# Patient Record
Sex: MALE | Race: WHITE | NOT HISPANIC OR LATINO | Employment: FULL TIME | RURAL
[De-identification: names, ages, dates, MRNs, and addresses within clinical notes are randomized per-mention and may not be internally consistent; named-entity substitution may affect disease eponyms.]

---

## 2021-01-04 ENCOUNTER — LAB (OUTPATIENT)
Dept: LAB | Facility: HOSPITAL | Age: 26
End: 2021-01-04

## 2021-01-04 ENCOUNTER — OFFICE VISIT (OUTPATIENT)
Dept: FAMILY MEDICINE CLINIC | Facility: CLINIC | Age: 26
End: 2021-01-04

## 2021-01-04 VITALS
WEIGHT: 181.5 LBS | BODY MASS INDEX: 23.29 KG/M2 | RESPIRATION RATE: 20 BRPM | DIASTOLIC BLOOD PRESSURE: 80 MMHG | OXYGEN SATURATION: 99 % | HEART RATE: 60 BPM | TEMPERATURE: 97.7 F | HEIGHT: 74 IN | SYSTOLIC BLOOD PRESSURE: 120 MMHG

## 2021-01-04 DIAGNOSIS — R30.0 DYSURIA: ICD-10-CM

## 2021-01-04 DIAGNOSIS — R10.9 ABDOMINAL CRAMPING: Primary | ICD-10-CM

## 2021-01-04 DIAGNOSIS — R11.0 NAUSEA: ICD-10-CM

## 2021-01-04 DIAGNOSIS — Z20.2 POSSIBLE EXPOSURE TO STD: ICD-10-CM

## 2021-01-04 LAB
BILIRUB BLD-MCNC: NEGATIVE MG/DL
CLARITY, POC: ABNORMAL
COLOR UR: ABNORMAL
GLUCOSE UR STRIP-MCNC: NEGATIVE MG/DL
KETONES UR QL: NEGATIVE
LEUKOCYTE EST, POC: NEGATIVE
NITRITE UR-MCNC: NEGATIVE MG/ML
PH UR: 8 [PH] (ref 5–8)
PROT UR STRIP-MCNC: ABNORMAL MG/DL
RBC # UR STRIP: NEGATIVE /UL
SP GR UR: 1.01 (ref 1–1.03)
UROBILINOGEN UR QL: NORMAL

## 2021-01-04 PROCEDURE — 87661 TRICHOMONAS VAGINALIS AMPLIF: CPT | Performed by: NURSE PRACTITIONER

## 2021-01-04 PROCEDURE — 85027 COMPLETE CBC AUTOMATED: CPT | Performed by: NURSE PRACTITIONER

## 2021-01-04 PROCEDURE — 87491 CHLMYD TRACH DNA AMP PROBE: CPT | Performed by: NURSE PRACTITIONER

## 2021-01-04 PROCEDURE — 96372 THER/PROPH/DIAG INJ SC/IM: CPT | Performed by: NURSE PRACTITIONER

## 2021-01-04 PROCEDURE — 82150 ASSAY OF AMYLASE: CPT | Performed by: NURSE PRACTITIONER

## 2021-01-04 PROCEDURE — 99203 OFFICE O/P NEW LOW 30 MIN: CPT | Performed by: NURSE PRACTITIONER

## 2021-01-04 PROCEDURE — 80053 COMPREHEN METABOLIC PANEL: CPT | Performed by: NURSE PRACTITIONER

## 2021-01-04 PROCEDURE — 83690 ASSAY OF LIPASE: CPT | Performed by: NURSE PRACTITIONER

## 2021-01-04 PROCEDURE — 87591 N.GONORRHOEAE DNA AMP PROB: CPT | Performed by: NURSE PRACTITIONER

## 2021-01-04 RX ORDER — DICYCLOMINE HCL 20 MG
20 TABLET ORAL EVERY 6 HOURS PRN
Qty: 20 TABLET | Refills: 0 | Status: SHIPPED | OUTPATIENT
Start: 2021-01-04 | End: 2021-01-22

## 2021-01-04 RX ORDER — ONDANSETRON 8 MG/1
8 TABLET, ORALLY DISINTEGRATING ORAL EVERY 8 HOURS PRN
Qty: 20 TABLET | Refills: 0 | Status: SHIPPED | OUTPATIENT
Start: 2021-01-04 | End: 2021-01-22

## 2021-01-04 RX ORDER — DOXYCYCLINE HYCLATE 100 MG/1
100 CAPSULE ORAL 2 TIMES DAILY
Qty: 14 CAPSULE | Refills: 0 | Status: SHIPPED | OUTPATIENT
Start: 2021-01-04 | End: 2021-01-11

## 2021-01-04 RX ORDER — CEFTRIAXONE 500 MG/1
500 INJECTION, POWDER, FOR SOLUTION INTRAMUSCULAR; INTRAVENOUS ONCE
Status: COMPLETED | OUTPATIENT
Start: 2021-01-04 | End: 2021-01-04

## 2021-01-04 RX ADMIN — CEFTRIAXONE 500 MG: 500 INJECTION, POWDER, FOR SOLUTION INTRAMUSCULAR; INTRAVENOUS at 11:54

## 2021-01-04 NOTE — PROGRESS NOTES
"Chief Complaint  No chief complaint on file.    Subjective    History of Present Illness      Con Kirkpatrick presents to [facility] for   [facility] Same Day/Walk in Clinic    PCP: None    CC: \"my stomach has been hurting last few days and I was feeling dizzy and light headed\"    Recently released from penitentiary.  Has been out for two months.  Is working and got a little dizzy today and had to leave.  Had unprotected sex with new partner recently and has since has some abdominal cramping, occasional discharge and some dysuria.  Hx of GC&C several years ago which was treated.       Abdominal Pain  This is a new problem. The current episode started in the past 7 days. The onset quality is gradual. The problem occurs intermittently. The problem has been waxing and waning. The pain is located in the generalized abdominal region. The pain is at a severity of 6/10. The quality of the pain is cramping. The abdominal pain does not radiate. Associated symptoms include nausea. Pertinent negatives include no anorexia, arthralgias, belching, constipation, diarrhea (\"looser than normal\"), dysuria, fever, flatus, frequency, headaches, hematochezia, hematuria, melena, myalgias, vomiting or weight loss. Nothing aggravates the pain. The pain is relieved by nothing. He has tried nothing for the symptoms. There is no history of abdominal surgery, colon cancer, Crohn's disease, gallstones, GERD, irritable bowel syndrome, pancreatitis, PUD or ulcerative colitis.   Exposure to STD  The patient's primary symptoms include penile discharge. The patient's pertinent negatives include no genital injury, genital itching, genital lesions, pelvic pain, penile pain, priapism, scrotal swelling or testicular pain. This is a new problem. The current episode started in the past 7 days. The problem occurs intermittently. The problem has been waxing and waning. Associated symptoms include abdominal pain and " "nausea. Pertinent negatives include no anorexia, chest pain, chills, constipation, coughing, diarrhea (\"looser than normal\"), discolored urine, dysuria, fever, flank pain, frequency, headaches, hematuria, hesitancy, joint pain, joint swelling, painful intercourse, rash, shortness of breath, sore throat, urgency, urinary retention or vomiting. He is sexually active. He inconsistently uses condoms. It is possible that his partner has an STD. His past medical history is significant for chlamydia and gonorrhea.        Objective   Vital Signs:   /80 (BP Location: Right arm, Patient Position: Sitting, Cuff Size: Adult)   Pulse 60   Temp 97.7 °F (36.5 °C) (Temporal)   Resp 20   Ht 188 cm (74\")   Wt 82.3 kg (181 lb 8 oz)   SpO2 99%   BMI 23.30 kg/m²     Physical Exam  Vitals signs and nursing note reviewed.   Constitutional:       General: He is not in acute distress.     Appearance: Normal appearance. He is not ill-appearing.   HENT:      Head: Normocephalic and atraumatic.      Right Ear: Tympanic membrane and ear canal normal.      Left Ear: Tympanic membrane and ear canal normal.      Nose: Nose normal.      Mouth/Throat:      Mouth: Mucous membranes are moist.      Pharynx: Oropharynx is clear. No oropharyngeal exudate or posterior oropharyngeal erythema.   Eyes:      General:         Right eye: No discharge.         Left eye: No discharge.      Conjunctiva/sclera: Conjunctivae normal.   Neck:      Musculoskeletal: Neck supple.   Cardiovascular:      Rate and Rhythm: Normal rate and regular rhythm.   Pulmonary:      Effort: Pulmonary effort is normal. No respiratory distress.      Breath sounds: Normal breath sounds. No wheezing, rhonchi or rales.   Abdominal:      General: Bowel sounds are normal.      Palpations: Abdomen is soft.      Tenderness: There is no abdominal tenderness. There is no right CVA tenderness, left CVA tenderness, guarding or rebound.      Comments: Mild generalized TTP, no rebound or " guarding noted   Lymphadenopathy:      Cervical: No cervical adenopathy.   Skin:     General: Skin is warm and dry.      Comments: +tattoos     Neurological:      General: No focal deficit present.      Mental Status: He is alert and oriented to person, place, and time.        Result Review :     UA    Urinalysis 1/4/21   Ketones, UA Negative   Leukocytes, UA Negative                     Assessment and Plan    Problem List Items Addressed This Visit     None      Visit Diagnoses     Abdominal cramping    -  Primary    Relevant Medications    dicyclomine (Bentyl) 20 MG tablet    Other Relevant Orders    CBC No Differential    Comprehensive metabolic panel    Amylase    Lipase    XR Abdomen Flat & Upright (In Office)    Dysuria        Relevant Orders    POCT urinalysis dipstick, automated (Completed)    Chlamydia trachomatis, Neisseria gonorrhoeae, Trichomonas vaginalis, PCR - Urine, Urine, Clean Catch    Nausea        Relevant Medications    ondansetron ODT (Zofran ODT) 8 MG disintegrating tablet    Other Relevant Orders    CBC No Differential    Comprehensive metabolic panel    Amylase    Lipase    XR Abdomen Flat & Upright (In Office)    Possible exposure to STD        Relevant Medications    doxycycline (VIBRAMYCIN) 100 MG capsule    cefTRIAXone (ROCEPHIN) injection 500 mg (Completed) (Start on 1/4/2021 12:30 PM)        Increase fluids  [place], BRAT diet for now  Rx for Bentyl, Zofran as needed for cramping/nausea  CBC, CMP, Amylase, Lipase pending  Abdominal xrays pending    GC&C pending  Treated with Rocephin, Doxycycline due to suspected STD exposure.   Safe sex practices encouraged.     Encouraged to establish with PCP prior to leaving today    RTW: 1-6-2021    See PCP or RTC if symptoms persist/worsen  See PCP for routine f/u visit and management of chronic medical conditions      This document has been electronically signed by PALOMO Spears on January 4, 2021 12:11 CST,.

## 2021-01-05 LAB
ALBUMIN SERPL-MCNC: 4.3 G/DL (ref 3.5–5.2)
ALBUMIN/GLOB SERPL: 1.9 G/DL
ALP SERPL-CCNC: 60 U/L (ref 39–117)
ALT SERPL W P-5'-P-CCNC: 10 U/L (ref 1–41)
AMYLASE SERPL-CCNC: 110 U/L (ref 28–100)
ANION GAP SERPL CALCULATED.3IONS-SCNC: 7.7 MMOL/L (ref 5–15)
AST SERPL-CCNC: 12 U/L (ref 1–40)
BILIRUB SERPL-MCNC: 0.3 MG/DL (ref 0–1.2)
BUN SERPL-MCNC: 12 MG/DL (ref 6–20)
BUN/CREAT SERPL: 10.9 (ref 7–25)
C TRACH RRNA CVX QL NAA+PROBE: NEGATIVE
CALCIUM SPEC-SCNC: 9.2 MG/DL (ref 8.6–10.5)
CHLORIDE SERPL-SCNC: 105 MMOL/L (ref 98–107)
CO2 SERPL-SCNC: 27.3 MMOL/L (ref 22–29)
CREAT SERPL-MCNC: 1.1 MG/DL (ref 0.76–1.27)
DEPRECATED RDW RBC AUTO: 38.3 FL (ref 37–54)
ERYTHROCYTE [DISTWIDTH] IN BLOOD BY AUTOMATED COUNT: 12.1 % (ref 12.3–15.4)
GFR SERPL CREATININE-BSD FRML MDRD: 80 ML/MIN/1.73
GLOBULIN UR ELPH-MCNC: 2.3 GM/DL
GLUCOSE SERPL-MCNC: 83 MG/DL (ref 65–99)
HCT VFR BLD AUTO: 44.7 % (ref 37.5–51)
HGB BLD-MCNC: 15.6 G/DL (ref 13–17.7)
LIPASE SERPL-CCNC: 131 U/L (ref 13–60)
MCH RBC QN AUTO: 30.4 PG (ref 26.6–33)
MCHC RBC AUTO-ENTMCNC: 34.9 G/DL (ref 31.5–35.7)
MCV RBC AUTO: 87.1 FL (ref 79–97)
N GONORRHOEA RRNA SPEC QL NAA+PROBE: NEGATIVE
PLATELET # BLD AUTO: 232 10*3/MM3 (ref 140–450)
PMV BLD AUTO: 11.6 FL (ref 6–12)
POTASSIUM SERPL-SCNC: 4.6 MMOL/L (ref 3.5–5.2)
PROT SERPL-MCNC: 6.6 G/DL (ref 6–8.5)
RBC # BLD AUTO: 5.13 10*6/MM3 (ref 4.14–5.8)
SODIUM SERPL-SCNC: 140 MMOL/L (ref 136–145)
WBC # BLD AUTO: 6.85 10*3/MM3 (ref 3.4–10.8)

## 2021-01-22 ENCOUNTER — OFFICE VISIT (OUTPATIENT)
Dept: FAMILY MEDICINE CLINIC | Facility: CLINIC | Age: 26
End: 2021-01-22

## 2021-01-22 ENCOUNTER — LAB (OUTPATIENT)
Dept: LAB | Facility: HOSPITAL | Age: 26
End: 2021-01-22

## 2021-01-22 VITALS
HEART RATE: 82 BPM | OXYGEN SATURATION: 97 % | HEIGHT: 74 IN | BODY MASS INDEX: 23.87 KG/M2 | WEIGHT: 186 LBS | TEMPERATURE: 98 F | SYSTOLIC BLOOD PRESSURE: 134 MMHG | DIASTOLIC BLOOD PRESSURE: 94 MMHG

## 2021-01-22 DIAGNOSIS — M21.42 PES PLANUS OF BOTH FEET: ICD-10-CM

## 2021-01-22 DIAGNOSIS — Z78.9 HISTORY OF INCARCERATION: ICD-10-CM

## 2021-01-22 DIAGNOSIS — R10.84 INTERMITTENT GENERALIZED ABDOMINAL PAIN: ICD-10-CM

## 2021-01-22 DIAGNOSIS — S46.812A TRAPEZIUS MUSCLE STRAIN, LEFT, INITIAL ENCOUNTER: Primary | ICD-10-CM

## 2021-01-22 DIAGNOSIS — Z11.59 NEED FOR HEPATITIS C SCREENING TEST: ICD-10-CM

## 2021-01-22 DIAGNOSIS — M21.41 PES PLANUS OF BOTH FEET: ICD-10-CM

## 2021-01-22 DIAGNOSIS — R82.90 ABNORMAL URINE: ICD-10-CM

## 2021-01-22 LAB
AMYLASE SERPL-CCNC: 56 U/L (ref 28–100)
BACTERIA UR QL AUTO: ABNORMAL /HPF
BILIRUB UR QL STRIP: NEGATIVE
CLARITY UR: ABNORMAL
COLOR UR: YELLOW
CRP SERPL-MCNC: <0.3 MG/DL (ref 0–0.5)
ERYTHROCYTE [SEDIMENTATION RATE] IN BLOOD: 4 MM/HR (ref 0–15)
GLUCOSE UR STRIP-MCNC: NEGATIVE MG/DL
HCV AB SER DONR QL: NORMAL
HGB UR QL STRIP.AUTO: NEGATIVE
HYALINE CASTS UR QL AUTO: ABNORMAL /LPF
KETONES UR QL STRIP: NEGATIVE
LEUKOCYTE ESTERASE UR QL STRIP.AUTO: NEGATIVE
LIPASE SERPL-CCNC: 29 U/L (ref 13–60)
NITRITE UR QL STRIP: NEGATIVE
PH UR STRIP.AUTO: 7 [PH] (ref 5–9)
PROT UR QL STRIP: NEGATIVE
RBC # UR: ABNORMAL /HPF
REF LAB TEST METHOD: ABNORMAL
SP GR UR STRIP: 1.01 (ref 1–1.03)
SQUAMOUS #/AREA URNS HPF: ABNORMAL /HPF
UROBILINOGEN UR QL STRIP: ABNORMAL
WBC UR QL AUTO: ABNORMAL /HPF

## 2021-01-22 PROCEDURE — 81001 URINALYSIS AUTO W/SCOPE: CPT | Performed by: STUDENT IN AN ORGANIZED HEALTH CARE EDUCATION/TRAINING PROGRAM

## 2021-01-22 PROCEDURE — 82150 ASSAY OF AMYLASE: CPT | Performed by: STUDENT IN AN ORGANIZED HEALTH CARE EDUCATION/TRAINING PROGRAM

## 2021-01-22 PROCEDURE — 86038 ANTINUCLEAR ANTIBODIES: CPT | Performed by: STUDENT IN AN ORGANIZED HEALTH CARE EDUCATION/TRAINING PROGRAM

## 2021-01-22 PROCEDURE — 86480 TB TEST CELL IMMUN MEASURE: CPT | Performed by: STUDENT IN AN ORGANIZED HEALTH CARE EDUCATION/TRAINING PROGRAM

## 2021-01-22 PROCEDURE — 86140 C-REACTIVE PROTEIN: CPT | Performed by: STUDENT IN AN ORGANIZED HEALTH CARE EDUCATION/TRAINING PROGRAM

## 2021-01-22 PROCEDURE — 83690 ASSAY OF LIPASE: CPT | Performed by: STUDENT IN AN ORGANIZED HEALTH CARE EDUCATION/TRAINING PROGRAM

## 2021-01-22 PROCEDURE — 86803 HEPATITIS C AB TEST: CPT | Performed by: STUDENT IN AN ORGANIZED HEALTH CARE EDUCATION/TRAINING PROGRAM

## 2021-01-22 PROCEDURE — 20552 NJX 1/MLT TRIGGER POINT 1/2: CPT | Performed by: STUDENT IN AN ORGANIZED HEALTH CARE EDUCATION/TRAINING PROGRAM

## 2021-01-22 PROCEDURE — 99214 OFFICE O/P EST MOD 30 MIN: CPT | Performed by: STUDENT IN AN ORGANIZED HEALTH CARE EDUCATION/TRAINING PROGRAM

## 2021-01-22 PROCEDURE — G0480 DRUG TEST DEF 1-7 CLASSES: HCPCS | Performed by: STUDENT IN AN ORGANIZED HEALTH CARE EDUCATION/TRAINING PROGRAM

## 2021-01-22 PROCEDURE — 85651 RBC SED RATE NONAUTOMATED: CPT | Performed by: STUDENT IN AN ORGANIZED HEALTH CARE EDUCATION/TRAINING PROGRAM

## 2021-01-22 RX ORDER — MELOXICAM 15 MG/1
15 TABLET ORAL DAILY
Qty: 30 TABLET | Refills: 0 | Status: SHIPPED | OUTPATIENT
Start: 2021-01-22 | End: 2021-05-14 | Stop reason: SDUPTHER

## 2021-01-22 NOTE — PROGRESS NOTES
"   Subjective:  Con Kirkpatrick is a 25 y.o. male who presents for left shoulder pain    Went to urgent care last thursday; had a systemic steroid injection which seemed to have helped but only minimally.  But since has had severe, debilitating, shoulder pain.  Denied trauma, inciting incident.  No swelling, erythema, or redness, fever, chills.  Denies any weakness, denies any numbness or tingling.  Aggravated by movement, no alleviating factors.    Additionally, patient with recent history of incarceration, does not remember if he is been screened for tuberculosis but believes he has been.  Denies any cough, bloody sputum, shortness of breath, chest pain.  Had several present tattoos, denied sharing needle, denied being tested for hep C.    Intermittent abdominal pain; patient states it comes and goes, does not have diarrhea or constipation, no food intolerance, acid reflux, epigastric pain.  No abdominal tenderness currently, no distention.     Lastly, patient states he has flat feet, would like referral for custom orthotics as it has helped in the past, no trauma, lesions or pain currently.    Vitals:    Vitals:    01/22/21 1301   BP: 134/94   Pulse: 82   Temp: 98 °F (36.7 °C)   SpO2: 97%   Weight: 84.4 kg (186 lb)   Height: 188 cm (74\")       Body mass index is 23.88 kg/m².      Current Outpatient Medications:   •  meloxicam (MOBIC) 15 MG tablet, Take 1 tablet by mouth Daily., Disp: 30 tablet, Rfl: 0    Review of Systems  Review of Systems   Constitutional: Negative for chills and fever.   HENT: Negative for congestion and sore throat.    Eyes: Negative for pain and visual disturbance.   Respiratory: Negative for cough and shortness of breath.    Cardiovascular: Negative for chest pain and palpitations.   Gastrointestinal: Negative for nausea and vomiting.   Endocrine: Negative for polydipsia and polyuria.   Genitourinary: Negative for dysuria and hematuria.   Musculoskeletal: Positive for neck pain.   "   Skin: Negative for rash and wound.   Neurological: Negative for dizziness and headaches.   Psychiatric/Behavioral: Negative for behavioral problems and confusion.       There is no problem list on file for this patient.    Past Surgical History:   Procedure Laterality Date   • HERNIA REPAIR       Social History     Socioeconomic History   • Marital status: Single     Spouse name: Not on file   • Number of children: Not on file   • Years of education: Not on file   • Highest education level: Not on file   Tobacco Use   • Smoking status: Never Smoker   • Smokeless tobacco: Never Used   Substance and Sexual Activity   • Alcohol use: Defer   • Drug use: Defer   • Sexual activity: Defer     Family History   Problem Relation Age of Onset   • Hypertension Mother      Office Visit on 01/04/2021   Component Date Value Ref Range Status   • Color 01/04/2021 Dark Yellow  Yellow, Straw, Dark Yellow, Charlene Final   • Clarity, UA 01/04/2021 Cloudy* Clear Final   • Specific Gravity  01/04/2021 1.015  1.005 - 1.030 Final   • pH, Urine 01/04/2021 8.0  5.0 - 8.0 Final   • Leukocytes 01/04/2021 Negative  Negative Final   • Nitrite, UA 01/04/2021 Negative  Negative Final   • Protein, POC 01/04/2021 Trace* Negative mg/dL Final   • Glucose, UA 01/04/2021 Negative  Negative, 1000 mg/dL (3+) mg/dL Final   • Ketones, UA 01/04/2021 Negative  Negative Final   • Urobilinogen, UA 01/04/2021 Normal  Normal Final   • Bilirubin 01/04/2021 Negative  Negative Final   • Blood, UA 01/04/2021 Negative  Negative Final   • Chlamydia DNA by PCR 01/04/2021 Negative  Negative Final   • Neisseria gonorrhoeae by PCR 01/04/2021 Negative  Negative Final   • WBC 01/04/2021 6.85  3.40 - 10.80 10*3/mm3 Final   • RBC 01/04/2021 5.13  4.14 - 5.80 10*6/mm3 Final   • Hemoglobin 01/04/2021 15.6  13.0 - 17.7 g/dL Final   • Hematocrit 01/04/2021 44.7  37.5 - 51.0 % Final   • MCV 01/04/2021 87.1  79.0 - 97.0 fL Final   • MCH 01/04/2021 30.4  26.6 - 33.0 pg Final   • MCHC  01/04/2021 34.9  31.5 - 35.7 g/dL Final   • RDW 01/04/2021 12.1* 12.3 - 15.4 % Final   • RDW-SD 01/04/2021 38.3  37.0 - 54.0 fl Final   • MPV 01/04/2021 11.6  6.0 - 12.0 fL Final   • Platelets 01/04/2021 232  140 - 450 10*3/mm3 Final   • Glucose 01/04/2021 83  65 - 99 mg/dL Final   • BUN 01/04/2021 12  6 - 20 mg/dL Final   • Creatinine 01/04/2021 1.10  0.76 - 1.27 mg/dL Final   • Sodium 01/04/2021 140  136 - 145 mmol/L Final   • Potassium 01/04/2021 4.6  3.5 - 5.2 mmol/L Final   • Chloride 01/04/2021 105  98 - 107 mmol/L Final   • CO2 01/04/2021 27.3  22.0 - 29.0 mmol/L Final   • Calcium 01/04/2021 9.2  8.6 - 10.5 mg/dL Final   • Total Protein 01/04/2021 6.6  6.0 - 8.5 g/dL Final   • Albumin 01/04/2021 4.30  3.50 - 5.20 g/dL Final   • ALT (SGPT) 01/04/2021 10  1 - 41 U/L Final   • AST (SGOT) 01/04/2021 12  1 - 40 U/L Final   • Alkaline Phosphatase 01/04/2021 60  39 - 117 U/L Final   • Total Bilirubin 01/04/2021 0.3  0.0 - 1.2 mg/dL Final   • eGFR Non African Amer 01/04/2021 80  >60 mL/min/1.73 Final   • Globulin 01/04/2021 2.3  gm/dL Final   • A/G Ratio 01/04/2021 1.9  g/dL Final   • BUN/Creatinine Ratio 01/04/2021 10.9  7.0 - 25.0 Final   • Anion Gap 01/04/2021 7.7  5.0 - 15.0 mmol/L Final   • Amylase 01/04/2021 110* 28 - 100 U/L Final   • Lipase 01/04/2021 131* 13 - 60 U/L Final      No image results found.    @[facility]HLI[facility]@    There is no immunization history on file for this patient.      The following portions of the patient's history were reviewed and updated as appropriate: allergies, current medications, past family history, past medical history, past social history, past surgical history and problem list.    Physical Exam  Physical Exam  Constitutional:       General: He is not in acute distress.  HENT:      Head: Normocephalic and atraumatic.      Right Ear: Tympanic membrane and ear canal normal.      Left Ear: Tympanic membrane and ear canal normal.      Mouth/Throat:      Mouth: Mucous membranes are  "moist.      Pharynx: Oropharynx is clear. No posterior oropharyngeal erythema.   Eyes:      Extraocular Movements: Extraocular movements intact.      Pupils: Pupils are equal, round, and reactive to light.   Neck:     Cardiovascular:      Rate and Rhythm: Normal rate and regular rhythm.      Heart sounds: Normal heart sounds. No murmur.   Pulmonary:      Effort: Pulmonary effort is normal.      Breath sounds: Normal breath sounds.   Abdominal:      General: Bowel sounds are normal.      Palpations: Abdomen is soft.      Tenderness: There is no abdominal tenderness.   Musculoskeletal:         General: No swelling.      Right lower leg: No edema.      Left lower leg: No edema.   Skin:     Coloration: Skin is not jaundiced.      Findings: No rash.   Neurological:      Mental Status: He is alert and oriented to person, place, and time. Mental status is at baseline.   Psychiatric:         Mood and Affect: Mood normal.         Behavior: Behavior normal.       Inject Trigger Point, 1 or 2    Date/Time: 1/26/2021 4:35 PM  Performed by: Henrry Hill MD  Authorized by: Henrry Hill MD   Consent: Verbal consent obtained. Written consent obtained.  Consent given by: patient  Patient understanding: patient states understanding of the procedure being performed  Patient consent: the patient's understanding of the procedure matches consent given  Procedure consent: procedure consent matches procedure scheduled  Relevant documents: relevant documents present and verified  Site marked: the operative site was marked  Imaging studies: imaging studies not available  Patient identity confirmed: verbally with patient  Time out: Immediately prior to procedure a \"time out\" was called to verify the correct patient, procedure, equipment, support staff and site/side marked as required.  Preparation: Patient was prepped and draped in the usual sterile fashion.  Local anesthesia used: no    Anesthesia:  Local anesthesia used: " no    Sedation:  Patient sedated: no    Patient tolerance: patient tolerated the procedure well with no immediate complications  Comments: Trigger point injection site palpated, located over left upper trapezius muscle.  Injection spot was marked via indentation.  Site was then cleaned in a sterile fashion with iodine, a 22 gauge 1 inch long needle was used to inject 0.5 mL of 1% lidocaine into trigger point, patient tolerated well, no complications.  Patient experience good relief following injection.          Assessment/Plan    Diagnosis Plan   1. Trapezius muscle strain, left, initial encounter  meloxicam (MOBIC) 15 MG tablet    Cyclobenzaprine (Flexeril)    Ambulatory Referral to Physical Therapy Evaluate and treat   2. Need for hepatitis C screening test  Hepatitis C Antibody   3. Intermittent generalized abdominal pain  Amylase    Lipase    Sedimentation rate, automated    C-reactive Protein    TABITHA   4. Pes planus of both feet  Ambulatory Referral to Podiatry   5. Abnormal urine  Urinalysis With Microscopic - Urine, Clean Catch    Urinalysis without microscopic (no culture) - Urine, Clean Catch    Urinalysis, Microscopic Only - Urine, Clean Catch   6. History of incarceration  QuantiFERON TB Gold      Orders Placed This Encounter   Procedures   • Hepatitis C Antibody   • Amylase   • Lipase   • Sedimentation rate, automated   • C-reactive Protein   • TABITHA   • Cyclobenzaprine (Flexeril)   • QuantiFERON TB Gold   • Urinalysis without microscopic (no culture) - Urine, Clean Catch   • Urinalysis, Microscopic Only - Urine, Clean Catch   • Ambulatory Referral to Physical Therapy Evaluate and treat     Referral Priority:   Routine     Referral Type:   Physical Therapy     Referral Reason:   Specialty Services Required     Requested Specialty:   Physical Therapy     Number of Visits Requested:   1   • Ambulatory Referral to Podiatry     Referral Priority:   Routine     Referral Type:   Consultation     Referral Reason:    Specialty Services Required     Referred to Provider:   Ananth Villanueva DPM     Requested Specialty:   Podiatry     Number of Visits Requested:   1   • Urinalysis With Microscopic - Urine, Clean Catch     Left trapezius muscle strain; will refer to physical therapy, supportive care as needed, trigger injection today was successful, patient experienced good relief.    Recent incarceration, will obtain QuantiFERON gold, hep C.    Intermittent abdominal pain; physical exam reassuring, however due to intermittent nature, will rule out autoimmune, pancreatic, liver, gallbladder etiology.  No unintentional weight loss, no fever, reassuring.  Follow-up in 1 month, sooner if needed.  Patient to keep a food diary.      This document has been electronically signed by Henrry Hill MD on January 26, 2021 16:32 CST

## 2021-01-25 LAB — ANA SER QL: NEGATIVE

## 2021-01-27 ENCOUNTER — TELEPHONE (OUTPATIENT)
Dept: FAMILY MEDICINE CLINIC | Facility: CLINIC | Age: 26
End: 2021-01-27

## 2021-01-27 ENCOUNTER — TREATMENT (OUTPATIENT)
Dept: PHYSICAL THERAPY | Facility: CLINIC | Age: 26
End: 2021-01-27

## 2021-01-27 DIAGNOSIS — M25.512 ACUTE PAIN OF LEFT SHOULDER: ICD-10-CM

## 2021-01-27 DIAGNOSIS — S46.812A TRAPEZIUS STRAIN, LEFT, INITIAL ENCOUNTER: Primary | ICD-10-CM

## 2021-01-27 PROCEDURE — 97161 PT EVAL LOW COMPLEX 20 MIN: CPT | Performed by: PHYSICAL THERAPIST

## 2021-01-27 NOTE — PROGRESS NOTES
Physical Therapy Initial Evaluation and Plan of Care      Patient: Con Kirkpatrick   : 1995  Diagnosis/ICD-10 Code:  Trapezius strain, left, initial encounter [S46.812A]  Referring practitioner: Henrry Hill MD  Date of Initial Visit: 2021  Today's Date: 2021  Patient seen for 1 sessions    Next MD appt: 2021.  Recertification: 2021          Subjective Questionnaire: QuickDASH: 58.09%      Subjective Evaluation    History of Present Illness  Date of onset: 1/15/2021  Mechanism of injury: Patient reports he was scrubbing his truck one night and then he had trouble lifting his arm. He reports it is getting some better but it is still tight. He reports he has issues with his neck in the past with issues.      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    Shoulders  Depressed and rounded.    Postural Observations  Seated posture: poor  Standing posture: poor  Correction of posture: makes symptoms better        Palpation   Left   Hypertonic in the levator scapulae and upper trapezius.   Tenderness of the levator scapulae and upper trapezius.   Trigger point to levator scapulae.     Cervical/Thoracic Screen   Cervical range of motion within normal limits    Neurological Testing     Sensation     Shoulder   Left Shoulder   Intact: light touch  Paresthesia: light touch    Right Shoulder   Intact: light touch    Comments   Left light touch: down  to the elbow.    Active Range of Motion   Left Shoulder   Flexion: 155 degrees   Abduction: 127 degrees   External rotation 90°: 80 degrees   Internal rotation 90°: 30 degrees     Right Shoulder   Flexion: 165 degrees   Abduction: 155 degrees   External rotation 90°: 80 degrees  Internal rotation 90°: 30 degrees     Passive Range of Motion   Left Shoulder   Normal passive range of motion    Scapular Mobility   Left Shoulder   Scapular mobility: WFL  Scapular Mobility with Shoulder to 90° FF   Scapular winging: moderate    Scapular Mobility beyond 90° FF   Scapular winging: moderate    Right Shoulder   Scapular mobility: WFL  Scapular Mobility with Shoulder to 90° FF   Scapular winging: moderate    Scapular Mobility beyond 90° FF   Scapular winging: moderate    Strength/Myotome Testing     Left Shoulder   Normal muscle strength    Planes of Motion   Left shoulder forward flexion strength: painful.   Left shoulder abduction strength: painful.     Right Shoulder   Normal muscle strength    Tests     Left Shoulder   Negative drop arm, empty can, full can, Hawkin's, laxity (step off), Neer's, painful arc and Speed's.     Right Shoulder   Negative drop arm, empty can, Hawkin's, laxity (step off), Neer's, painful arc and Speed's.     Ambulation     Comments   FWB, non-antalgic gait, no distress, no assistive device, no significant gait deviation, normal arm swing with gait.          Assessment & Plan     Assessment  Impairments: abnormal or restricted ROM, activity intolerance, lacks appropriate home exercise program and pain with function  Assessment details: Patient is a 26yo male with significant decrease in overall postural awareness. He injured is L UT/LS while cleaning his truck about 2 weeks ago and is having tenderness and painful ROM/strengthsince then. He has significant decrease in overall postural awareness also. Patient's insurance requires authorization prior to treatment beginning. Small HEP was  established.  Prognosis: good  Prognosis details: Barriers to Rehab: Include significant or possible arthritic/degenerative changes that have occurred within the joint/spine.    Safety Issues: None noted.    Functional Limitations: carrying objects, lifting, sleeping, pulling, pushing, uncomfortable because of pain, reaching behind back, reaching overhead and unable to perform repetitive tasks  Goals  Plan Goals: Short Term Goals:  1) I with HEP and have additions/changes by next re-certification.    2) Patient to be more aware of posture and posture correction technique.    3) AROM L Shoulder flexion >=140°.    4) AROM L Shoulder abduction >=140°.    5) AROM B shoulder IR >= 55° at 90° of shoulder abduction.    Long Term Goals:  1) AROM L Shoulder flexion/abduction >=155°.    3) AROM B shoulder IR >= 70° at 90° of shoulder abduction.    4) B UE 5/5 with no increase in pain.    5) Patient able to perform 1 arm box carry with 20# for 3 minutes with no increase in pain.    6) Patient able to perform 3 minutes of OH activities with no increase in pain.    7) I with final HEP    Plan  Therapy options: will be seen for skilled physical therapy services  Planned modality interventions: cryotherapy, high voltage pulsed current (spasm management) and ultrasound  Other planned modality interventions: Combo US/Estim as appropriate  Planned therapy interventions: flexibility, functional ROM exercises, home exercise program, manual therapy, neuromuscular re-education, postural training, soft tissue mobilization, joint mobilization, strengthening, stretching and therapeutic activities  Treatment plan discussed with: patient  Plan details: Progress overall ROM, strength, scap stab, posture, function,and activity tolerance.      Other therapeutic activities and/or exercises will be prescribed depending on the patients progress or lack there of.     Visit Diagnoses:    ICD-10-CM ICD-9-CM   1. Trapezius strain, left, initial  encounter  S46.812A 840.8   2. Acute pain of left shoulder  M25.512 719.41       Timed:  Manual Therapy:         mins  46569;  Therapeutic Exercise:         mins  02710;     Aquatic Ther Ex:         mins  64053;     Neuromuscular Rochelle:        mins  37819;    Therapeutic Activity:          mins  03115;     Gait Training:           mins  93272;     Ultrasound:          mins  16215;    Paraffin:        mins  44495;  Electrical Stimulation:         mins  15059 ( );    Untimed:  Electrical Stimulation:         mins  98139 ( );  Mechanical Traction:         mins  33007;     Timed Treatment:      mins   Total Treatment:     25   mins    PT SIGNATURE: Maryan Reina PT DPT, [facility]   DATE TREATMENT INITIATED: 1/27/2021    Initial Certification  Certification Period: 4/27/2021  I certify that the therapy services are furnished while this patient is under my care.  The services outlined above are required by this patient, and will be reviewed every 90 days.     PHYSICIAN: Henrry Hill MD      DATE:     Please sign and return via fax to  .. Thank you, [facility].

## 2021-01-27 NOTE — TELEPHONE ENCOUNTER
----- Message from Henrry Hill MD sent at 1/26/2021  5:54 PM CST -----  Overall, labs reassuring.  No signs of autoimmune disease, hepatitis C, pancreatic disease. Urine abnormalities have since resolved.  Currently pending tuberculosis test.  We will update as soon as we know.

## 2021-01-28 ENCOUNTER — TELEPHONE (OUTPATIENT)
Dept: ORTHOPEDICS | Facility: OTHER | Age: 26
End: 2021-01-28

## 2021-01-31 LAB — CYCLOBENZAPRINE SERPL-MCNC: <10 NG/ML (ref 10–30)

## 2021-02-02 ENCOUNTER — TELEPHONE (OUTPATIENT)
Dept: PHYSICAL THERAPY | Facility: CLINIC | Age: 26
End: 2021-02-02

## 2021-02-02 NOTE — TELEPHONE ENCOUNTER
Called patient regarding no show for today's PT appointment.  No answer and no voicemail available to leave message.

## 2021-02-05 ENCOUNTER — TELEPHONE (OUTPATIENT)
Dept: PHYSICAL THERAPY | Facility: CLINIC | Age: 26
End: 2021-02-05

## 2021-02-05 NOTE — TELEPHONE ENCOUNTER
Called regarding no show for today's PT appointment.  No answer, no voicemail available to leave message.

## 2021-02-19 ENCOUNTER — TELEPHONE (OUTPATIENT)
Dept: FAMILY MEDICINE CLINIC | Facility: CLINIC | Age: 26
End: 2021-02-19

## 2021-02-23 ENCOUNTER — OFFICE VISIT (OUTPATIENT)
Dept: FAMILY MEDICINE CLINIC | Facility: CLINIC | Age: 26
End: 2021-02-23

## 2021-02-23 ENCOUNTER — LAB (OUTPATIENT)
Dept: LAB | Facility: HOSPITAL | Age: 26
End: 2021-02-23

## 2021-02-23 VITALS
WEIGHT: 175 LBS | RESPIRATION RATE: 18 BRPM | BODY MASS INDEX: 22.46 KG/M2 | OXYGEN SATURATION: 98 % | HEART RATE: 80 BPM | HEIGHT: 74 IN | DIASTOLIC BLOOD PRESSURE: 78 MMHG | SYSTOLIC BLOOD PRESSURE: 152 MMHG

## 2021-02-23 DIAGNOSIS — A64 STI (SEXUALLY TRANSMITTED INFECTION): Primary | ICD-10-CM

## 2021-02-23 PROCEDURE — 87661 TRICHOMONAS VAGINALIS AMPLIF: CPT | Performed by: STUDENT IN AN ORGANIZED HEALTH CARE EDUCATION/TRAINING PROGRAM

## 2021-02-23 PROCEDURE — 99213 OFFICE O/P EST LOW 20 MIN: CPT | Performed by: STUDENT IN AN ORGANIZED HEALTH CARE EDUCATION/TRAINING PROGRAM

## 2021-02-23 PROCEDURE — 87491 CHLMYD TRACH DNA AMP PROBE: CPT | Performed by: STUDENT IN AN ORGANIZED HEALTH CARE EDUCATION/TRAINING PROGRAM

## 2021-02-23 PROCEDURE — 96372 THER/PROPH/DIAG INJ SC/IM: CPT | Performed by: STUDENT IN AN ORGANIZED HEALTH CARE EDUCATION/TRAINING PROGRAM

## 2021-02-23 PROCEDURE — 87591 N.GONORRHOEAE DNA AMP PROB: CPT | Performed by: STUDENT IN AN ORGANIZED HEALTH CARE EDUCATION/TRAINING PROGRAM

## 2021-02-23 RX ORDER — CEFTRIAXONE 500 MG/1
500 INJECTION, POWDER, FOR SOLUTION INTRAMUSCULAR; INTRAVENOUS EVERY 24 HOURS
Status: COMPLETED | OUTPATIENT
Start: 2021-02-23 | End: 2021-02-23

## 2021-02-23 RX ORDER — AZITHROMYCIN 500 MG/1
1000 TABLET, FILM COATED ORAL ONCE
Qty: 2 TABLET | Refills: 0 | Status: SHIPPED | OUTPATIENT
Start: 2021-02-23 | End: 2021-02-23

## 2021-02-23 RX ADMIN — CEFTRIAXONE 500 MG: 500 INJECTION, POWDER, FOR SOLUTION INTRAMUSCULAR; INTRAVENOUS at 12:08

## 2021-02-25 LAB
C TRACH RRNA CVX QL NAA+PROBE: NEGATIVE
N GONORRHOEA RRNA SPEC QL NAA+PROBE: NEGATIVE

## 2021-03-05 NOTE — PROGRESS NOTES
"   Subjective:  Con Kirkpatrick is a 25 y.o. male who presents for STI exposure    Patient states that he received call from health department stating he was exposed to chlamydia.  Patient with recent negative STI panel, denied any current symptoms.  However patient admits to having been exposed and demi infection frequently in the past.  Does not wear protection during sex, no penile discharge, dysuria, genital rash, chills, nausea, vomiting, diarrhea.    Vitals:    Vitals:    02/23/21 1108   BP: 152/78   Pulse: 80   Resp: 18   SpO2: 98%   Weight: 79.4 kg (175 lb)   Height: 188 cm (74\")     Body mass index is 22.47 kg/m².    Current Outpatient Medications:   •  meloxicam (MOBIC) 15 MG tablet, Take 1 tablet by mouth Daily., Disp: 30 tablet, Rfl: 0    Review of Systems  Review of Systems   Constitutional: Negative for chills and fever.   HENT: Negative for congestion and sore throat.    Eyes: Negative for pain and visual disturbance.   Respiratory: Negative for cough and shortness of breath.    Cardiovascular: Negative for chest pain and palpitations.   Gastrointestinal: Negative for nausea and vomiting.   Endocrine: Negative for polydipsia and polyuria.   Genitourinary: Negative for dysuria and hematuria.   Skin: Negative for rash and wound.   Neurological: Negative for dizziness and headaches.   Psychiatric/Behavioral: Negative for behavioral problems and confusion.       There is no problem list on file for this patient.    Past Surgical History:   Procedure Laterality Date   • HERNIA REPAIR       Social History     Socioeconomic History   • Marital status: Single     Spouse name: Not on file   • Number of children: Not on file   • Years of education: Not on file   • Highest education level: Not on file   Tobacco Use   • Smoking status: Never Smoker   • Smokeless tobacco: Never Used   Substance and Sexual Activity   • Alcohol use: Not Currently   • Drug use: Defer   • Sexual activity: Defer     Family " History   Problem Relation Age of Onset   • Hypertension Mother      Office Visit on 01/22/2021   Component Date Value Ref Range Status   • Hepatitis C Ab 01/22/2021 Non-Reactive  Non-Reactive Final   • Amylase 01/22/2021 56  28 - 100 U/L Final   • Lipase 01/22/2021 29  13 - 60 U/L Final   • Sed Rate 01/22/2021 4  0 - 15 mm/hr Final   • C-Reactive Protein 01/22/2021 <0.30  0.00 - 0.50 mg/dL Final   • TABITHA Direct 01/22/2021 Negative  Negative Final   • Cyclobenzaprine (Flexeril) 01/22/2021 <10* 10 - 30 ng/mL Final    This test was developed and its performance characteristics  determined by [facility].  It has not been cleared or approved  by the Food and Drug Administration.   • Color, UA 01/22/2021 Yellow  Yellow, Straw, Dark Yellow, Charlene Final   • Appearance, UA 01/22/2021 Cloudy* Clear Final   • pH, UA 01/22/2021 7.0  5.0 - 9.0 Final   • Specific Gravity, UA 01/22/2021 1.014  1.003 - 1.030 Final   • Glucose, UA 01/22/2021 Negative  Negative Final   • Ketones, UA 01/22/2021 Negative  Negative Final   • Bilirubin, UA 01/22/2021 Negative  Negative Final   • Blood, UA 01/22/2021 Negative  Negative Final   • Protein, UA 01/22/2021 Negative  Negative Final   • Leuk Esterase, UA 01/22/2021 Negative  Negative Final   • Nitrite, UA 01/22/2021 Negative  Negative Final   • Urobilinogen, UA 01/22/2021 0.2 E.U./dL  0.2 - 1.0 E.U./dL Final   • RBC, UA 01/22/2021 0-2* None Seen /HPF Final   • WBC, UA 01/22/2021 0-2  None Seen, 0-2, 3-5 /HPF Final   • Bacteria, UA 01/22/2021 None Seen  None Seen /HPF Final   • Squamous Epithelial Cells, UA 01/22/2021 None Seen  None Seen, 0-2 /HPF Final   • Hyaline Casts, UA 01/22/2021 None Seen  None Seen /LPF Final   • Methodology 01/22/2021 Automated Microscopy   Final   Office Visit on 01/04/2021   Component Date Value Ref Range Status   • Color 01/04/2021 Dark Yellow  Yellow, Straw, Dark Yellow, Charlene Final   • Clarity, UA 01/04/2021 Cloudy* Clear Final   • Specific Gravity  01/04/2021 1.015   1.005 - 1.030 Final   • pH, Urine 01/04/2021 8.0  5.0 - 8.0 Final   • Leukocytes 01/04/2021 Negative  Negative Final   • Nitrite, UA 01/04/2021 Negative  Negative Final   • Protein, POC 01/04/2021 Trace* Negative mg/dL Final   • Glucose, UA 01/04/2021 Negative  Negative, 1000 mg/dL (3+) mg/dL Final   • Ketones, UA 01/04/2021 Negative  Negative Final   • Urobilinogen, UA 01/04/2021 Normal  Normal Final   • Bilirubin 01/04/2021 Negative  Negative Final   • Blood, UA 01/04/2021 Negative  Negative Final   • Chlamydia DNA by PCR 01/04/2021 Negative  Negative Final   • Neisseria gonorrhoeae by PCR 01/04/2021 Negative  Negative Final   • WBC 01/04/2021 6.85  3.40 - 10.80 10*3/mm3 Final   • RBC 01/04/2021 5.13  4.14 - 5.80 10*6/mm3 Final   • Hemoglobin 01/04/2021 15.6  13.0 - 17.7 g/dL Final   • Hematocrit 01/04/2021 44.7  37.5 - 51.0 % Final   • MCV 01/04/2021 87.1  79.0 - 97.0 fL Final   • MCH 01/04/2021 30.4  26.6 - 33.0 pg Final   • MCHC 01/04/2021 34.9  31.5 - 35.7 g/dL Final   • RDW 01/04/2021 12.1* 12.3 - 15.4 % Final   • RDW-SD 01/04/2021 38.3  37.0 - 54.0 fl Final   • MPV 01/04/2021 11.6  6.0 - 12.0 fL Final   • Platelets 01/04/2021 232  140 - 450 10*3/mm3 Final   • Glucose 01/04/2021 83  65 - 99 mg/dL Final   • BUN 01/04/2021 12  6 - 20 mg/dL Final   • Creatinine 01/04/2021 1.10  0.76 - 1.27 mg/dL Final   • Sodium 01/04/2021 140  136 - 145 mmol/L Final   • Potassium 01/04/2021 4.6  3.5 - 5.2 mmol/L Final   • Chloride 01/04/2021 105  98 - 107 mmol/L Final   • CO2 01/04/2021 27.3  22.0 - 29.0 mmol/L Final   • Calcium 01/04/2021 9.2  8.6 - 10.5 mg/dL Final   • Total Protein 01/04/2021 6.6  6.0 - 8.5 g/dL Final   • Albumin 01/04/2021 4.30  3.50 - 5.20 g/dL Final   • ALT (SGPT) 01/04/2021 10  1 - 41 U/L Final   • AST (SGOT) 01/04/2021 12  1 - 40 U/L Final   • Alkaline Phosphatase 01/04/2021 60  39 - 117 U/L Final   • Total Bilirubin 01/04/2021 0.3  0.0 - 1.2 mg/dL Final   • eGFR Non African Amer 01/04/2021 80  >60  mL/min/1.73 Final   • Globulin 01/04/2021 2.3  gm/dL Final   • A/G Ratio 01/04/2021 1.9  g/dL Final   • BUN/Creatinine Ratio 01/04/2021 10.9  7.0 - 25.0 Final   • Anion Gap 01/04/2021 7.7  5.0 - 15.0 mmol/L Final   • Amylase 01/04/2021 110* 28 - 100 U/L Final   • Lipase 01/04/2021 131* 13 - 60 U/L Final      No image results found.    @[facility]@    There is no immunization history on file for this patient.    The following portions of the patient's history were reviewed and updated as appropriate: allergies, current medications, past family history, past medical history, past social history, past surgical history and problem list.        Physical Exam  Physical Exam  Constitutional:       General: He is not in acute distress.  HENT:      Head: Normocephalic and atraumatic.      Right Ear: Tympanic membrane and ear canal normal.      Left Ear: Tympanic membrane and ear canal normal.      Mouth/Throat:      Mouth: Mucous membranes are moist.      Pharynx: Oropharynx is clear. No posterior oropharyngeal erythema.   Eyes:      Extraocular Movements: Extraocular movements intact.      Pupils: Pupils are equal, round, and reactive to light.   Cardiovascular:      Rate and Rhythm: Normal rate and regular rhythm.      Heart sounds: Normal heart sounds. No murmur.   Pulmonary:      Effort: Pulmonary effort is normal.      Breath sounds: Normal breath sounds.   Abdominal:      General: Bowel sounds are normal.      Palpations: Abdomen is soft.      Tenderness: There is no abdominal tenderness.   Genitourinary:     Comments: Patient deferred exam  Musculoskeletal:         General: No swelling.      Right lower leg: No edema.      Left lower leg: No edema.   Skin:     Coloration: Skin is not jaundiced.      Findings: No rash.   Neurological:      Mental Status: He is alert and oriented to person, place, and time. Mental status is at baseline.   Psychiatric:         Mood and Affect: Mood normal.         Behavior: Behavior  normal.         Assessment/Plan    Diagnosis Plan   1. STI (sexually transmitted infection)  cefTRIAXone (ROCEPHIN) injection 500 mg    azithromycin (Zithromax) 500 MG tablet    HIV-1 / O / 2 Ag / Antibody 4th Generation    RPR    Chlamydia trachomatis, Neisseria gonorrhoeae, Trichomonas vaginalis, PCR - Urine, Urine, Clean Catch      Orders Placed This Encounter   Procedures   • Chlamydia trachomatis, Neisseria gonorrhoeae, Trichomonas vaginalis, PCR - Urine, Urine, Clean Catch   • HIV-1 / O / 2 Ag / Antibody 4th Generation   • RPR     STI exposure; will empirically treat for chlamydia, labs as above, patient agreeable to plan, has tolerated Rocephin and is atheroma past well, counseled on importance of safe sex, barrier protection, patient agreeable to plan.      This document has been electronically signed by Henrry Hill MD on March 5, 2021 16:00 CST

## 2021-03-10 ENCOUNTER — TELEPHONE (OUTPATIENT)
Dept: FAMILY MEDICINE CLINIC | Facility: CLINIC | Age: 26
End: 2021-03-10

## 2021-03-10 NOTE — TELEPHONE ENCOUNTER
The returned to work note that he got today isn't good enough. He needs a note stating that he has no resections

## 2021-05-13 ENCOUNTER — TELEPHONE (OUTPATIENT)
Dept: FAMILY MEDICINE CLINIC | Facility: CLINIC | Age: 26
End: 2021-05-13

## 2021-05-14 ENCOUNTER — OFFICE VISIT (OUTPATIENT)
Dept: FAMILY MEDICINE CLINIC | Facility: CLINIC | Age: 26
End: 2021-05-14

## 2021-05-14 VITALS
HEART RATE: 70 BPM | WEIGHT: 173 LBS | DIASTOLIC BLOOD PRESSURE: 74 MMHG | RESPIRATION RATE: 18 BRPM | BODY MASS INDEX: 22.2 KG/M2 | OXYGEN SATURATION: 98 % | SYSTOLIC BLOOD PRESSURE: 138 MMHG | HEIGHT: 74 IN

## 2021-05-14 DIAGNOSIS — F41.9 ANXIETY: Primary | ICD-10-CM

## 2021-05-14 DIAGNOSIS — M21.41 PES PLANUS OF BOTH FEET: ICD-10-CM

## 2021-05-14 DIAGNOSIS — M21.42 PES PLANUS OF BOTH FEET: ICD-10-CM

## 2021-05-14 DIAGNOSIS — Z78.9 ATTEMPTING TO CONCEIVE: ICD-10-CM

## 2021-05-14 PROCEDURE — 99214 OFFICE O/P EST MOD 30 MIN: CPT | Performed by: STUDENT IN AN ORGANIZED HEALTH CARE EDUCATION/TRAINING PROGRAM

## 2021-05-14 RX ORDER — MELOXICAM 15 MG/1
15 TABLET ORAL DAILY
Qty: 90 TABLET | Refills: 0 | Status: SHIPPED | OUTPATIENT
Start: 2021-05-14 | End: 2021-06-24 | Stop reason: SDUPTHER

## 2021-05-14 RX ORDER — PAROXETINE 10 MG/1
10 TABLET, FILM COATED ORAL EVERY MORNING
Qty: 90 TABLET | Refills: 0 | Status: SHIPPED | OUTPATIENT
Start: 2021-05-14 | End: 2021-06-24 | Stop reason: SDUPTHER

## 2021-06-16 ENCOUNTER — TELEPHONE (OUTPATIENT)
Dept: FAMILY MEDICINE CLINIC | Facility: CLINIC | Age: 26
End: 2021-06-16

## 2021-06-16 NOTE — TELEPHONE ENCOUNTER
The referral to urology with Dr Broomfield is scheduled for 8/10/21 at 11:30.     Tried to call pt. There was no answer.

## 2021-06-21 NOTE — TELEPHONE ENCOUNTER
Called pt and notified him of appt date and time. Pt said he had to cancel his appt on 6/18 but is going to call to get that rescheduled. He has to ask his boss if the 60 days is working days or not because he is hired on temporarily for 60 days then he will be hired on full time. He stated that Dr Hill said he could increase to 20 mg of Paxil daily if needed. He states he has been using 20 mg of Paxil daily and taking his Mobic  BID and is wanting to know if he can get refills on these if he can't make his f/u appt before he runs out. He will call me tomorrow to set up appt and to let me know how much medication he has left.

## 2021-06-24 ENCOUNTER — OFFICE VISIT (OUTPATIENT)
Dept: FAMILY MEDICINE CLINIC | Facility: CLINIC | Age: 26
End: 2021-06-24

## 2021-06-24 VITALS
HEIGHT: 74 IN | OXYGEN SATURATION: 97 % | SYSTOLIC BLOOD PRESSURE: 118 MMHG | RESPIRATION RATE: 20 BRPM | WEIGHT: 176 LBS | DIASTOLIC BLOOD PRESSURE: 68 MMHG | HEART RATE: 73 BPM | BODY MASS INDEX: 22.59 KG/M2

## 2021-06-24 DIAGNOSIS — F41.9 ANXIETY: ICD-10-CM

## 2021-06-24 DIAGNOSIS — M21.41 PES PLANUS OF BOTH FEET: ICD-10-CM

## 2021-06-24 DIAGNOSIS — J30.2 SEASONAL ALLERGIES: Primary | ICD-10-CM

## 2021-06-24 DIAGNOSIS — M21.42 PES PLANUS OF BOTH FEET: ICD-10-CM

## 2021-06-24 DIAGNOSIS — F52.32 INHIBITED ORGASM MALE: ICD-10-CM

## 2021-06-24 PROCEDURE — 99214 OFFICE O/P EST MOD 30 MIN: CPT | Performed by: STUDENT IN AN ORGANIZED HEALTH CARE EDUCATION/TRAINING PROGRAM

## 2021-06-24 RX ORDER — MELOXICAM 15 MG/1
15 TABLET ORAL DAILY
Qty: 90 TABLET | Refills: 0 | Status: SHIPPED | OUTPATIENT
Start: 2021-06-24 | End: 2021-07-23 | Stop reason: SDUPTHER

## 2021-06-24 RX ORDER — CETIRIZINE HYDROCHLORIDE 10 MG/1
10 TABLET ORAL DAILY
Qty: 90 TABLET | Refills: 1 | Status: SHIPPED | OUTPATIENT
Start: 2021-06-24

## 2021-06-24 RX ORDER — CETIRIZINE HYDROCHLORIDE 10 MG/1
TABLET ORAL
COMMUNITY
Start: 2021-05-26 | End: 2021-06-24 | Stop reason: SDUPTHER

## 2021-06-24 RX ORDER — PAROXETINE 30 MG/1
30 TABLET, FILM COATED ORAL EVERY MORNING
Qty: 90 TABLET | Refills: 1 | Status: SHIPPED | OUTPATIENT
Start: 2021-06-24

## 2021-07-23 ENCOUNTER — OFFICE VISIT (OUTPATIENT)
Dept: FAMILY MEDICINE CLINIC | Facility: CLINIC | Age: 26
End: 2021-07-23

## 2021-07-23 VITALS
DIASTOLIC BLOOD PRESSURE: 78 MMHG | HEIGHT: 74 IN | HEART RATE: 63 BPM | OXYGEN SATURATION: 98 % | RESPIRATION RATE: 20 BRPM | SYSTOLIC BLOOD PRESSURE: 142 MMHG | WEIGHT: 175 LBS | BODY MASS INDEX: 22.46 KG/M2

## 2021-07-23 DIAGNOSIS — S63.641S: Primary | ICD-10-CM

## 2021-07-23 DIAGNOSIS — M21.41 PES PLANUS OF BOTH FEET: ICD-10-CM

## 2021-07-23 DIAGNOSIS — M21.42 PES PLANUS OF BOTH FEET: ICD-10-CM

## 2021-07-23 DIAGNOSIS — R45.4 DIFFICULTY CONTROLLING ANGER: ICD-10-CM

## 2021-07-23 DIAGNOSIS — S61.011S THUMB LACERATION, RIGHT, SEQUELA: ICD-10-CM

## 2021-07-23 PROBLEM — S61.011A: Status: ACTIVE | Noted: 2021-07-13

## 2021-07-23 PROBLEM — S66.221A: Status: ACTIVE | Noted: 2021-07-13

## 2021-07-23 PROCEDURE — 99214 OFFICE O/P EST MOD 30 MIN: CPT | Performed by: STUDENT IN AN ORGANIZED HEALTH CARE EDUCATION/TRAINING PROGRAM

## 2021-07-23 RX ORDER — MELOXICAM 15 MG/1
15 TABLET ORAL DAILY
Qty: 90 TABLET | Refills: 1 | Status: SHIPPED | OUTPATIENT
Start: 2021-07-23

## 2021-07-23 RX ORDER — CEPHALEXIN 250 MG/1
CAPSULE ORAL
COMMUNITY
Start: 2021-07-21
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
## 2023-08-08 ENCOUNTER — OFFICE VISIT (OUTPATIENT)
Dept: FAMILY MEDICINE CLINIC | Facility: CLINIC | Age: 28
End: 2023-08-08
Payer: MEDICAID

## 2023-08-08 VITALS
TEMPERATURE: 98.2 F | SYSTOLIC BLOOD PRESSURE: 130 MMHG | HEIGHT: 74 IN | BODY MASS INDEX: 25.95 KG/M2 | DIASTOLIC BLOOD PRESSURE: 90 MMHG | OXYGEN SATURATION: 96 % | HEART RATE: 62 BPM | WEIGHT: 202.2 LBS

## 2023-08-08 DIAGNOSIS — L30.9 DERMATITIS: Primary | ICD-10-CM

## 2023-08-08 DIAGNOSIS — F41.9 ANXIETY: ICD-10-CM

## 2023-08-08 DIAGNOSIS — B96.89 BACTERIAL CONJUNCTIVITIS OF BOTH EYES: ICD-10-CM

## 2023-08-08 DIAGNOSIS — H10.9 BACTERIAL CONJUNCTIVITIS OF BOTH EYES: ICD-10-CM

## 2023-08-08 PROCEDURE — 1159F MED LIST DOCD IN RCRD: CPT | Performed by: STUDENT IN AN ORGANIZED HEALTH CARE EDUCATION/TRAINING PROGRAM

## 2023-08-08 PROCEDURE — 99214 OFFICE O/P EST MOD 30 MIN: CPT | Performed by: STUDENT IN AN ORGANIZED HEALTH CARE EDUCATION/TRAINING PROGRAM

## 2023-08-08 PROCEDURE — 1160F RVW MEDS BY RX/DR IN RCRD: CPT | Performed by: STUDENT IN AN ORGANIZED HEALTH CARE EDUCATION/TRAINING PROGRAM

## 2023-08-08 RX ORDER — TRIAMCINOLONE ACETONIDE 5 MG/G
1 OINTMENT TOPICAL 2 TIMES DAILY
Qty: 15 G | Refills: 3 | Status: SHIPPED | OUTPATIENT
Start: 2023-08-08

## 2023-08-08 RX ORDER — POLYMYXIN B SULFATE AND TRIMETHOPRIM 1; 10000 MG/ML; [USP'U]/ML
1 SOLUTION OPHTHALMIC EVERY 4 HOURS
Qty: 10 ML | Refills: 1 | Status: SHIPPED | OUTPATIENT
Start: 2023-08-08

## 2023-08-08 NOTE — PROGRESS NOTES
"Subjective:  Con Kirkpatrick is a 28 y.o. male who presents for     Rash; patient states that approximate 4 days ago, has had a itchy, red, rash over bilateral lower extremities.  Believes he was in contact with poison ivy or poison oak.  Denies any shortness of breath, chest pain, wheezing, dyspnea on exertion.    Eye complaint; states has had bilateral eye itchiness, crusty discharge, light sensitivity in right eye.  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  Saw ophthalmology and was given an eyedrop, states that it improved but then shortly after had return of symptoms.  Denies any fever, chills, grittiness sensation in eyes.    Patient Active Problem List   Diagnosis    Laceration of extensor muscle, fascia and tendon of right thumb at wrist and hand level, initial encounter    Laceration of thumb, right, complicated     Vitals:    Vitals:    08/08/23 1603   BP: 130/90   Pulse: 62   Temp: 98.2 øF (36.8 øC)   TempSrc: Oral   SpO2: 96%   Weight: 91.7 kg (202 lb 3.2 oz)   Height: 188 cm (74\")     Body mass index is 25.96 kg/mý.      Current Outpatient Medications:     triamcinolone (KENALOG) 0.5 % ointment, Apply 1 application  topically to the appropriate area as directed 2 (Two) Times a Day. For use on body, 2 weeks on, 1 week off., Disp: 15 g, Rfl: 3    trimethoprim-polymyxin b (POLYTRIM) 11910-5.1 UNIT/ML-% ophthalmic solution, Administer 1 drop to both eyes Every 4 (Four) Hours., Disp: 10 mL, Rfl: 1    Patient Active Problem List   Diagnosis    Laceration of extensor muscle, fascia and tendon of right thumb at wrist and hand level, initial encounter    Laceration of thumb, right, complicated     Past Surgical History:   Procedure Laterality Date    HERNIA REPAIR       Social History     Socioeconomic History    Marital status: Single   Tobacco Use    Smoking status: Never    Smokeless tobacco: Never   Vaping Use    Vaping Use: Never used   Substance and Sexual Activity    Alcohol use: Not " Currently    Drug use: Defer    Sexual activity: Defer     Family History   Problem Relation Age of Onset    Hypertension Mother      No visits with results within 6 Month(s) from this visit.   Latest known visit with results is:   Office Visit on 02/23/2021   Component Date Value Ref Range Status    Chlamydia DNA by PCR 02/23/2021 Negative  Negative Final    Neisseria gonorrhoeae by PCR 02/23/2021 Negative  Negative Final      No image results found.      [unfilled]  Immunization History   Administered Date(s) Administered    FLUAD TRI 65YR+ 09/30/2009    Fluzone >6mos 10/29/2020    Hepatitis A 10/29/2020    Td (TDVAX) 08/24/2005    Tdap 05/25/2016     The following portions of the patient's history were reviewed and updated as appropriate: allergies, current medications, past family history, past medical history, past social history, past surgical history and problem list.    PHQ-9 Total Score: 0           Physical Exam  Constitutional:       General: He is not in acute distress.  HENT:      Head: Normocephalic and atraumatic.   Eyes:      General: Lids are normal. Lids are everted, no foreign bodies appreciated.         Right eye: Discharge present. No foreign body.         Left eye: Discharge present.No foreign body.      Extraocular Movements: Extraocular movements intact.      Right eye: Normal extraocular motion.      Left eye: Normal extraocular motion.      Conjunctiva/sclera:      Right eye: Right conjunctiva is injected.      Left eye: Left conjunctiva is injected.   Cardiovascular:      Rate and Rhythm: Normal rate and regular rhythm.      Heart sounds: Normal heart sounds. No murmur heard.  Pulmonary:      Effort: Pulmonary effort is normal.      Breath sounds: Normal breath sounds.   Abdominal:      Palpations: Abdomen is soft.      Tenderness: There is no abdominal tenderness.   Musculoskeletal:         General: No swelling.      Right lower leg: No edema.      Left lower leg: No edema.   Skin:      Coloration: Skin is not jaundiced.      Findings: No rash.   Neurological:      Mental Status: He is alert and oriented to person, place, and time. Mental status is at baseline.   Psychiatric:         Mood and Affect: Mood normal.         Behavior: Behavior normal.       Assessment & Plan    Diagnosis Plan   1. Dermatitis  triamcinolone (KENALOG) 0.5 % ointment      2. Bacterial conjunctivitis of both eyes  trimethoprim-polymyxin b (POLYTRIM) 04220-5.1 UNIT/ML-% ophthalmic solution      3. Anxiety  Ambulatory Referral to Behavioral Health         Orders Placed This Encounter   Procedures    Ambulatory Referral to Behavioral Health     Referral Priority:   Routine     Referral Type:   Behavorial Health/Psych     Referral Reason:   Specialty Services Required     Requested Specialty:   Behavioral Health     Number of Visits Requested:   1     Contact dermatitis; after discussion of treatment options, will treat with topical steroid as above, given strict ER/return precaution, no signs of complication, reassuring.    Bilateral conjunctivitis; treat with topical antibiotic as above, instructed to make urgent follow-up with ophthalmology due to light sensitivity.  Patient voiced understanding, agreeable to plan.    Anxiety; currently not on any medications, after discussion with patient, will refer to therapy, follow-up 1 month, low threshold to start on medication if needed.      This document has been electronically signed by Henrry Hill MD on August 8, 2023 16:43 CDT    EMR Dragon/Transciption Disclaimer: Some of this note may be an electronic transcription/translation of spoken language to printed text.  The electronic translation of spoken language may permit erroneous, or at times, nonsensical words or phrases to be inadvertently transcribed. Although I have reviewed the note for such errors, some may still exist.

## 2023-08-15 ENCOUNTER — TELEPHONE (OUTPATIENT)
Dept: FAMILY MEDICINE CLINIC | Facility: CLINIC | Age: 28
End: 2023-08-15
Payer: MEDICAID

## 2023-08-15 NOTE — TELEPHONE ENCOUNTER
Pt would like a call back regarding pcp sending prescription in for anxiety. Call back [phone]

## 2023-08-16 ENCOUNTER — TELEMEDICINE (OUTPATIENT)
Dept: PSYCHIATRY | Facility: CLINIC | Age: 28
End: 2023-08-16
Payer: MEDICAID

## 2023-08-16 DIAGNOSIS — F33.1 MAJOR DEPRESSIVE DISORDER, RECURRENT EPISODE, MODERATE: ICD-10-CM

## 2023-08-16 DIAGNOSIS — F19.10 SUBSTANCE ABUSE: ICD-10-CM

## 2023-08-16 DIAGNOSIS — F41.1 GENERALIZED ANXIETY DISORDER: Primary | ICD-10-CM

## 2023-08-16 RX ORDER — ESCITALOPRAM OXALATE 20 MG/1
TABLET ORAL
Qty: 30 TABLET | Refills: 5 | Status: SHIPPED | OUTPATIENT
Start: 2023-08-16

## 2023-08-16 NOTE — PATIENT INSTRUCTIONS
For concerns or needing assistance call the Behavioral Health [facility] at [phone]  Start lexapro 20 mg daily with food
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
## 2023-09-12 ENCOUNTER — TELEMEDICINE (OUTPATIENT)
Dept: PSYCHIATRY | Facility: CLINIC | Age: 28
End: 2023-09-12
Payer: MEDICAID

## 2023-09-12 DIAGNOSIS — F33.1 MAJOR DEPRESSIVE DISORDER, RECURRENT EPISODE, MODERATE: Primary | ICD-10-CM

## 2023-09-12 DIAGNOSIS — F41.1 GENERALIZED ANXIETY DISORDER: ICD-10-CM

## 2023-09-12 RX ORDER — BUPROPION HYDROCHLORIDE 150 MG/1
150 TABLET ORAL EVERY MORNING
Qty: 90 TABLET | Refills: 0 | Status: SHIPPED | OUTPATIENT
Start: 2023-09-12
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
## 2023-09-12 NOTE — PATIENT INSTRUCTIONS
For concerns or needing assistance call the [facility] at [phone]  Start Wellbutrin  mg tablet take at the beginning of waking hours.  Cut Lexapro in half and take for 3 to 4 days, then discontinue.

## 2023-09-20 ENCOUNTER — OFFICE VISIT (OUTPATIENT)
Dept: FAMILY MEDICINE CLINIC | Facility: CLINIC | Age: 28
End: 2023-09-20
Payer: MEDICAID

## 2023-09-20 ENCOUNTER — LAB (OUTPATIENT)
Dept: LAB | Facility: HOSPITAL | Age: 28
End: 2023-09-20
Payer: MEDICAID

## 2023-09-20 VITALS
DIASTOLIC BLOOD PRESSURE: 82 MMHG | OXYGEN SATURATION: 97 % | HEIGHT: 74 IN | HEART RATE: 68 BPM | BODY MASS INDEX: 25.03 KG/M2 | WEIGHT: 195 LBS | TEMPERATURE: 98.6 F | SYSTOLIC BLOOD PRESSURE: 124 MMHG

## 2023-09-20 DIAGNOSIS — Z20.2 EXPOSURE TO CHLAMYDIA: ICD-10-CM

## 2023-09-20 DIAGNOSIS — M21.42 PES PLANUS OF BOTH FEET: Primary | ICD-10-CM

## 2023-09-20 DIAGNOSIS — R52 GENERALIZED PAIN: ICD-10-CM

## 2023-09-20 DIAGNOSIS — M21.41 PES PLANUS OF BOTH FEET: Primary | ICD-10-CM

## 2023-09-20 DIAGNOSIS — Z79.899 ENCOUNTER FOR LONG-TERM CURRENT USE OF MEDICATION: ICD-10-CM

## 2023-09-20 PROCEDURE — 99214 OFFICE O/P EST MOD 30 MIN: CPT | Performed by: STUDENT IN AN ORGANIZED HEALTH CARE EDUCATION/TRAINING PROGRAM

## 2023-09-20 PROCEDURE — 1160F RVW MEDS BY RX/DR IN RCRD: CPT | Performed by: STUDENT IN AN ORGANIZED HEALTH CARE EDUCATION/TRAINING PROGRAM

## 2023-09-20 PROCEDURE — 87491 CHLMYD TRACH DNA AMP PROBE: CPT | Performed by: STUDENT IN AN ORGANIZED HEALTH CARE EDUCATION/TRAINING PROGRAM

## 2023-09-20 PROCEDURE — 87661 TRICHOMONAS VAGINALIS AMPLIF: CPT | Performed by: STUDENT IN AN ORGANIZED HEALTH CARE EDUCATION/TRAINING PROGRAM

## 2023-09-20 PROCEDURE — G0432 EIA HIV-1/HIV-2 SCREEN: HCPCS | Performed by: STUDENT IN AN ORGANIZED HEALTH CARE EDUCATION/TRAINING PROGRAM

## 2023-09-20 PROCEDURE — 1159F MED LIST DOCD IN RCRD: CPT | Performed by: STUDENT IN AN ORGANIZED HEALTH CARE EDUCATION/TRAINING PROGRAM

## 2023-09-20 PROCEDURE — 85025 COMPLETE CBC W/AUTO DIFF WBC: CPT | Performed by: STUDENT IN AN ORGANIZED HEALTH CARE EDUCATION/TRAINING PROGRAM

## 2023-09-20 PROCEDURE — 87591 N.GONORRHOEAE DNA AMP PROB: CPT | Performed by: STUDENT IN AN ORGANIZED HEALTH CARE EDUCATION/TRAINING PROGRAM

## 2023-09-20 PROCEDURE — 86592 SYPHILIS TEST NON-TREP QUAL: CPT | Performed by: STUDENT IN AN ORGANIZED HEALTH CARE EDUCATION/TRAINING PROGRAM

## 2023-09-20 PROCEDURE — 80053 COMPREHEN METABOLIC PANEL: CPT | Performed by: STUDENT IN AN ORGANIZED HEALTH CARE EDUCATION/TRAINING PROGRAM

## 2023-09-20 RX ORDER — DOXYCYCLINE HYCLATE 100 MG/1
100 CAPSULE ORAL 2 TIMES DAILY
Qty: 14 CAPSULE | Refills: 0 | Status: SHIPPED | OUTPATIENT
Start: 2023-09-20

## 2023-09-20 RX ORDER — CYCLOBENZAPRINE HYDROCHLORIDE 7.5 MG/1
7.5 TABLET, FILM COATED ORAL 3 TIMES DAILY PRN
Qty: 90 TABLET | Refills: 1 | Status: SHIPPED | OUTPATIENT
Start: 2023-09-20

## 2023-09-20 RX ORDER — IBUPROFEN 800 MG/1
800 TABLET ORAL EVERY 6 HOURS PRN
Qty: 90 TABLET | Refills: 1 | Status: SHIPPED | OUTPATIENT
Start: 2023-09-20

## 2023-09-20 NOTE — PROGRESS NOTES
"Subjective:  Con Kirkpatrick is a 28 y.o. male who presents for     Podiatry referral; patient states has been seeing podiatrist at [facility] for flatfeet, needs custom orthotics, would like referral today.  No acute complaints, no swelling, fever, chills, rash.    Chlamydia exposure; patient states had a sexual partner that gave him chlamydia and was treated however had relations with her again and is not sure if she was appropriately treated.  Does have mild symptoms similar to previous.  Denies any history of gonorrhea, lymphadenopathy, rash or any urinary symptoms.    Patient Active Problem List   Diagnosis    Laceration of extensor muscle, fascia and tendon of right thumb at wrist and hand level, initial encounter    Laceration of thumb, right, complicated     Vitals:    Vitals:    09/20/23 1417   BP: 124/82   Pulse: 68   Temp: 98.6 °F (37 °C)   TempSrc: Oral   SpO2: 97%   Weight: 88.5 kg (195 lb)   Height: 188 cm (74\")     Body mass index is 25.04 kg/m².      Current Outpatient Medications:     buPROPion XL (Wellbutrin XL) 150 MG 24 hr tablet, Take 1 tablet by mouth Every Morning., Disp: 90 tablet, Rfl: 0    cyclobenzaprine (FEXMID) 7.5 MG tablet, Take 1 tablet by mouth 3 (Three) Times a Day As Needed for Muscle Spasms., Disp: 90 tablet, Rfl: 1    doxycycline (VIBRAMYCIN) 100 MG capsule, Take 1 capsule by mouth 2 (Two) Times a Day., Disp: 14 capsule, Rfl: 0    ibuprofen (ADVIL,MOTRIN) 800 MG tablet, Take 1 tablet by mouth Every 6 (Six) Hours As Needed for Mild Pain., Disp: 90 tablet, Rfl: 1    Patient Active Problem List   Diagnosis    Laceration of extensor muscle, fascia and tendon of right thumb at wrist and hand level, initial encounter    Laceration of thumb, right, complicated     Past Surgical History:   Procedure Laterality Date    HERNIA REPAIR       Social History     Socioeconomic History    Marital status: Single   Tobacco Use    Smoking status: Never    Smokeless tobacco: Never   Vaping " Use    Vaping Use: Never used   Substance and Sexual Activity    Alcohol use: Not Currently    Drug use: Not Currently     Types: Amphetamines, Marijuana, Methamphetamines    Sexual activity: Yes     Partners: Female     Birth control/protection: None     Family History   Problem Relation Age of Onset    Hypertension Mother      No visits with results within 6 Month(s) from this visit.   Latest known visit with results is:   Office Visit on 02/23/2021   Component Date Value Ref Range Status    Chlamydia DNA by PCR 02/23/2021 Negative  Negative Final    Neisseria gonorrhoeae by PCR 02/23/2021 Negative  Negative Final      No image results found.      [email or web address]  Immunization History   Administered Date(s) Administered    FLUAD TRI 65YR+ 09/30/2009    Fluzone (or Fluarix & Flulaval for VFC) >6mos 10/29/2020    Hepatitis A 10/29/2020    Td (TDVAX) 08/24/2005    Tdap 05/25/2016     The following portions of the patient's history were reviewed and updated as appropriate: allergies, current medications, past family history, past medical history, past social history, past surgical history and problem list.    PHQ-9 Total Score:             Physical Exam  Constitutional:       General: He is not in acute distress.  HENT:      Head: Normocephalic and atraumatic.   Cardiovascular:      Rate and Rhythm: Normal rate and regular rhythm.      Heart sounds: Normal heart sounds. No murmur heard.  Pulmonary:      Effort: Pulmonary effort is normal.      Breath sounds: Normal breath sounds.   Abdominal:      Palpations: Abdomen is soft.      Tenderness: There is no abdominal tenderness.   Genitourinary:     Comments: Patient deferred  Musculoskeletal:         General: No swelling.      Right lower leg: No edema.      Left lower leg: No edema.   Skin:     Coloration: Skin is not jaundiced.      Findings: No rash.   Neurological:      Mental Status: He is alert and oriented to person, place, and time. Mental status is at baseline.    Psychiatric:         Mood and Affect: Mood normal.         Behavior: Behavior normal.       Assessment & Plan    Diagnosis Plan   1. Pes planus of both feet  Ambulatory Referral to Podiatry      2. Exposure to chlamydia  Chlamydia trachomatis, Neisseria gonorrhoeae, Trichomonas vaginalis, PCR - Urine, Urine, Clean Catch    RPR    HIV-1 / O / 2 Ag / Antibody    doxycycline (VIBRAMYCIN) 100 MG capsule      3. Generalized pain  ibuprofen (ADVIL,MOTRIN) 800 MG tablet    cyclobenzaprine (FEXMID) 7.5 MG tablet      4. Encounter for long-term current use of medication  CBC & Differential    Comprehensive Metabolic Panel         Orders Placed This Encounter   Procedures    Chlamydia trachomatis, Neisseria gonorrhoeae, Trichomonas vaginalis, PCR - Urine, Urine, Clean Catch     Order Specific Question:   Release to patient     Answer:   Routine Release [[id number]]    RPR     Order Specific Question:   Release to patient     Answer:   Routine Release [[id number]]    HIV-1 / O / 2 Ag / Antibody     Order Specific Question:   Release to patient     Answer:   Routine Release [[id number]]    Comprehensive Metabolic Panel     Order Specific Question:   Release to patient     Answer:   Routine Release [[id number]]    CBC Auto Differential     Order Specific Question:   Release to patient     Answer:   Routine Release [[id number]]    Ambulatory Referral to Podiatry     Referral Priority:   Routine     Referral Type:   Consultation     Referral Reason:   Specialty Services Required     Referred to Provider:   Ananth Villanueva DPM     Requested Specialty:   Podiatry     Number of Visits Requested:   1    CBC & Differential     Order Specific Question:   Manual Differential     Answer:   No     Order Specific Question:   Release to patient     Answer:   Routine Release [[id number]]     Chlamydia exposure; will not treat empirically, counseled on safe sex practices, obtain labs as above, treat as indicated.  Follow-up in 6 months  or sooner if needed.    Pes planus; will refer to podiatry for further management, custom orthotics.            This document has been electronically signed by Henrry Hill MD on September 20, 2023 18:03 CDT    EMR Dragon/Transciption Disclaimer: Some of this note may be an electronic transcription/translation of spoken language to printed text.  The electronic translation of spoken language may permit erroneous, or at times, nonsensical words or phrases to be inadvertently transcribed. Although I have reviewed the note for such errors, some may still exist.

## 2023-09-21 LAB
ALBUMIN SERPL-MCNC: 4.7 G/DL (ref 3.5–5.2)
ALBUMIN/GLOB SERPL: 1.7 G/DL
ALP SERPL-CCNC: 82 U/L (ref 39–117)
ALT SERPL W P-5'-P-CCNC: 15 U/L (ref 1–41)
ANION GAP SERPL CALCULATED.3IONS-SCNC: 16 MMOL/L (ref 5–15)
AST SERPL-CCNC: 21 U/L (ref 1–40)
BASOPHILS # BLD AUTO: 0.06 10*3/MM3 (ref 0–0.2)
BASOPHILS NFR BLD AUTO: 1.2 % (ref 0–1.5)
BILIRUB SERPL-MCNC: 0.5 MG/DL (ref 0–1.2)
BUN SERPL-MCNC: 16 MG/DL (ref 6–20)
BUN/CREAT SERPL: 15.5 (ref 7–25)
CALCIUM SPEC-SCNC: 9.8 MG/DL (ref 8.6–10.5)
CHLORIDE SERPL-SCNC: 98 MMOL/L (ref 98–107)
CO2 SERPL-SCNC: 24 MMOL/L (ref 22–29)
CREAT SERPL-MCNC: 1.03 MG/DL (ref 0.76–1.27)
DEPRECATED RDW RBC AUTO: 38.1 FL (ref 37–54)
EGFRCR SERPLBLD CKD-EPI 2021: 100.2 ML/MIN/1.73
EOSINOPHIL # BLD AUTO: 0.42 10*3/MM3 (ref 0–0.4)
EOSINOPHIL NFR BLD AUTO: 8.1 % (ref 0.3–6.2)
ERYTHROCYTE [DISTWIDTH] IN BLOOD BY AUTOMATED COUNT: 12.4 % (ref 12.3–15.4)
GLOBULIN UR ELPH-MCNC: 2.7 GM/DL
GLUCOSE SERPL-MCNC: 101 MG/DL (ref 65–99)
HCT VFR BLD AUTO: 45.6 % (ref 37.5–51)
HGB BLD-MCNC: 16 G/DL (ref 13–17.7)
HIV 1+2 AB+HIV1 P24 AG SERPL QL IA: NORMAL
IMM GRANULOCYTES # BLD AUTO: 0.05 10*3/MM3 (ref 0–0.05)
IMM GRANULOCYTES NFR BLD AUTO: 1 % (ref 0–0.5)
LYMPHOCYTES # BLD AUTO: 1.48 10*3/MM3 (ref 0.7–3.1)
LYMPHOCYTES NFR BLD AUTO: 28.5 % (ref 19.6–45.3)
MCH RBC QN AUTO: 30 PG (ref 26.6–33)
MCHC RBC AUTO-ENTMCNC: 35.1 G/DL (ref 31.5–35.7)
MCV RBC AUTO: 85.6 FL (ref 79–97)
MONOCYTES # BLD AUTO: 0.35 10*3/MM3 (ref 0.1–0.9)
MONOCYTES NFR BLD AUTO: 6.7 % (ref 5–12)
NEUTROPHILS NFR BLD AUTO: 2.84 10*3/MM3 (ref 1.7–7)
NEUTROPHILS NFR BLD AUTO: 54.5 % (ref 42.7–76)
NRBC BLD AUTO-RTO: 0 /100 WBC (ref 0–0.2)
PLATELET # BLD AUTO: 261 10*3/MM3 (ref 140–450)
PMV BLD AUTO: 11.6 FL (ref 6–12)
POTASSIUM SERPL-SCNC: 4.5 MMOL/L (ref 3.5–5.2)
PROT SERPL-MCNC: 7.4 G/DL (ref 6–8.5)
RBC # BLD AUTO: 5.33 10*6/MM3 (ref 4.14–5.8)
SODIUM SERPL-SCNC: 138 MMOL/L (ref 136–145)
WBC NRBC COR # BLD: 5.2 10*3/MM3 (ref 3.4–10.8)

## 2023-09-22 LAB
C TRACH RRNA CVX QL NAA+PROBE: NEGATIVE
N GONORRHOEA RRNA SPEC QL NAA+PROBE: NEGATIVE
RPR SER QL: NORMAL
TRICHOMONAS VAGINALIS PCR: NEGATIVE

## 2023-10-10 ENCOUNTER — TELEMEDICINE (OUTPATIENT)
Dept: PSYCHIATRY | Facility: CLINIC | Age: 28
End: 2023-10-10
Payer: MEDICAID

## 2023-10-10 DIAGNOSIS — F19.10 SUBSTANCE ABUSE: ICD-10-CM

## 2023-10-10 DIAGNOSIS — F33.1 MAJOR DEPRESSIVE DISORDER, RECURRENT EPISODE, MODERATE: Primary | ICD-10-CM

## 2023-10-10 DIAGNOSIS — F41.1 GENERALIZED ANXIETY DISORDER: ICD-10-CM
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
## 2023-10-17 ENCOUNTER — TELEMEDICINE (OUTPATIENT)
Dept: PSYCHIATRY | Facility: CLINIC | Age: 28
End: 2023-10-17
Payer: MEDICAID

## 2023-10-17 DIAGNOSIS — F41.1 GENERALIZED ANXIETY DISORDER: ICD-10-CM

## 2023-10-17 DIAGNOSIS — F33.1 MAJOR DEPRESSIVE DISORDER, RECURRENT EPISODE, MODERATE: Primary | ICD-10-CM

## 2023-10-17 DIAGNOSIS — Z63.0 RELATIONSHIP PROBLEM BETWEEN PARTNERS: ICD-10-CM

## 2023-10-17 DIAGNOSIS — F19.10 SUBSTANCE ABUSE: ICD-10-CM

## 2023-10-17 RX ORDER — ESCITALOPRAM OXALATE 20 MG/1
TABLET ORAL
Qty: 30 TABLET | Refills: 2 | Status: SHIPPED | OUTPATIENT
Start: 2023-10-17

## 2023-10-17 RX ORDER — TRAZODONE HYDROCHLORIDE 50 MG/1
TABLET ORAL
Qty: 60 TABLET | Refills: 1 | Status: SHIPPED | OUTPATIENT
Start: 2023-10-17

## 2023-10-17 SDOH — SOCIAL STABILITY - SOCIAL INSECURITY: PROBLEMS IN RELATIONSHIP WITH SPOUSE OR PARTNER: Z63.0

## 2023-10-17 NOTE — PATIENT INSTRUCTIONS
For concerns or needing assistance call the [facility] at [phone]    Lexapro 20 mg daily, take with food  Vraylar 1.5 mg daily for depression  Trazodone 50 mg tablet, take 1 to 2 tablets 30 minutes prior to our of sleep for insomnia as needed  Continue psychotherapy  Seriously consider going to sobriety meetings such as AA, or celebrate recovery.
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
## 2023-10-19 ENCOUNTER — TELEMEDICINE (OUTPATIENT)
Dept: PSYCHIATRY | Facility: CLINIC | Age: 28
End: 2023-10-19
Payer: MEDICAID

## 2023-10-19 DIAGNOSIS — F41.1 GENERALIZED ANXIETY DISORDER: ICD-10-CM

## 2023-10-19 DIAGNOSIS — F33.1 MAJOR DEPRESSIVE DISORDER, RECURRENT EPISODE, MODERATE: Primary | ICD-10-CM

## 2023-10-19 NOTE — PROGRESS NOTES
Date: October 19, 2023  Time In: 12:30PM  Time Out: 1:30PM  This provider is located at [place], [place] for [facility] (through [facility]), [address], [place] [address] using a secure [employer] Video Visit through [employer]. Patient is being seen remotely via telehealth at home address in [place] and stated they are in a secure environment for this session. The patient's condition being diagnosed/treated is appropriate for telemedicine. The provider identified herself as well as her credentials. The patient, and/or patients guardian, consent to be seen remotely, and when consent is given they understand that the consent allows for patient identifiable information to be sent to a third party as needed. They may refuse to be seen remotely at any time. The electronic data is encrypted and password protected, and the patient and/or guardian has been advised of the potential risks to privacy not withstanding such measures.     You have chosen to receive care through a telehealth visit.  Do you consent to use a video/audio connection for your medical care today? Yes    PROGRESS NOTE  Data:  Con Kirkpatrick is a 28 y.o. male who presents today for follow up    Chief Complaint: Anxiety and Depression    History of Present Illness: Patient reports fluctuating anxiety and depression through the week. 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 Patient reports recent difficulties in relationship and breaking up with girlfriend this week. Patient reports excessive worrying and thoughts including difficulty regulating anger. Patient reports drinking last weekend and realizing that he doesn't need to be going down the same road. Patient reports considering starting attending meetings again. Patient identified spending time to son is main priority and needing to be a good provider. Patient reports discussing medication  changes with provider and processed starting new medications this week.       Clinical Maneuvering/Intervention: CBT, MI, Patient Centered    (Scales based on 0 - 10 with 10 being the worst)  Depression: 8 Anxiety: 8       Assisted patient in processing above session content; acknowledged and normalized patient’s thoughts, feelings, and concerns.  Rationalized patient thought process regarding recent stressors and life events. Discussed triggers associated with patient's emotions. Utilized CBT to process through and correct irrational cognitions with emphasis on  anger and impulsive behavior . Also discussed coping skills for patient to implement. Discussed ways of finding positive people and developing positive support network.  Processed through thoughts and emotions surrounding recent break-up and leaving job last week.  Identified impulsive behavior and anger as well as the thoughts and emotions that result.  Explored ways to decrease impulsivity and regulate emotions to practice through the week.  Discussed continued work towards self-awareness, identifying triggers to anger, continuing gainful employment, and increasing self-care.    Allowed patient to freely discuss issues without interruption or judgment. Provided safe, confidential environment to facilitate the development of positive therapeutic relationship and encourage open, honest communication. Assisted patient in identifying risk factors which would indicate the need for higher level of care including thoughts to harm self or others and/or self-harming behavior and encouraged patient to contact this office, call 911, or present to the nearest emergency room should any of these events occur. Discussed crisis intervention services and means to access. Patient adamantly and convincingly denies current suicidal or homicidal ideation or perceptual disturbance.    Assessment:   Assessment   Patient appears to maintain relative stability as compared to  their baseline.  However, patient continues to struggle with anxiety and depression which continues to cause impairment in important areas of functioning.  A result, they can be reasonably expected to continue to benefit from treatment and would likely be at increased risk for decompensation otherwise.    Mental Status Exam:   Hygiene:   good  Cooperation:  Cooperative  Eye Contact:  Good  Psychomotor Behavior:  Appropriate  Affect:  Full range  Mood: fluctates  Speech:  Normal  Thought Process:  Linear  Thought Content:  Mood congruent  Suicidal:  None  Homicidal:  None  Hallucinations:  None  Delusion:  None  Memory:  Intact  Orientation:  Person, Place, Time and Situation  Reliability:  fair  Insight:  Fair  Judgement:  Fair  Impulse Control:  Fair  Physical/Medical Issues:  No        Patient's Support Network Includes:  extended family    Functional Status: Mild impairment     Progress toward goal: Patient is working towards practicing the ABC's of CBT model while at home to process through and correct or improve cognitions. Patient is making progress on processing thoughts and emotions during sessions, utilizing positive thinking strategies, daily positive affirmations and self care practices.     Prognosis: Good with Ongoing Treatment            Plan:    Patient will continue in individual outpatient therapy with focus on improved functioning and coping skills, maintaining stability, and avoiding decompensation and the need for higher level of care.    Patient will adhere to medication regimen as prescribed and report any side effects. Patient will contact this office, call 911 or present to the nearest emergency room should suicidal or homicidal ideations occur. Provide Cognitive Behavioral Therapy and Solution Focused Therapy to improve functioning, maintain stability, and avoid decompensation and the need for higher level of care.     Return in about 2 weeks, or earlier if symptoms worsen or fail to  improve.           VISIT DIAGNOSIS:     ICD-10-CM ICD-9-CM   1. Major depressive disorder, recurrent episode, moderate  F33.1 296.32   2. Generalized anxiety disorder  F41.1 300.02        Diagnoses and all orders for this visit:    1. Major depressive disorder, recurrent episode, moderate (Primary)    2. Generalized anxiety disorder           [facility] No Show Policy:  We understand unexpected circumstances arise; however, anytime you miss your appointment we are unable to provide you appropriate care.  In addition, each appointment missed could have been used to provide care for others.  We ask that you call at least 24 hours in advance to cancel or reschedule an appointment.  We would like to take this opportunity to remind you of our policy stating patients who miss THREE or more appointments without cancelling or rescheduling 24 hours in advance of the appointment may be subject to cancellation of any further visits with our clinic and recommendation to seek in-person services/visits.    Please call [phone] to reschedule your appointment. If there are reasons that make it difficult for you to keep the appointments, please call and let us know how we can help.  Please understand that medication prescribing will not continue without seeing your provider.      [facility]'s No Show Policy reviewed with patient at today's visit. Patient verbalized understanding of this policy. Discussed with patient that in the event that there are three or more no show visits, it will be recommended that they pursue in-person services/visits as noncompliance with telehealth visits indicates that patient is not an appropriate candidate for telemedicine and would likely be more appropriate for in-person services/visits. Patient verbalizes understanding and is agreeable to this.       This document has been electronically signed by Rupert Wright III, VIDYA  October 19, 2023 15:51  EDT      Part of this note may be an electronic transcription/translation of spoken language to printed text using the Dragon Dictation System.

## 2023-11-01 ENCOUNTER — TELEMEDICINE (OUTPATIENT)
Dept: PSYCHIATRY | Facility: CLINIC | Age: 28
End: 2023-11-01
Payer: MEDICAID

## 2023-11-01 NOTE — PROGRESS NOTES
Date: November 1, 2023  Time In: 3:30PM  Time Out: 4:30PM  This provider is located at [place], [place] for [facility] (through [facility]), [address], [place] [address] using a secure [employer] Video Visit through [employer]. Patient is being seen remotely via telehealth at home address in [place] and stated they are in a secure environment for this session. The patient's condition being diagnosed/treated is appropriate for telemedicine. The provider identified herself as well as her credentials. The patient, and/or patients guardian, consent to be seen remotely, and when consent is given they understand that the consent allows for patient identifiable information to be sent to a third party as needed. They may refuse to be seen remotely at any time. The electronic data is encrypted and password protected, and the patient and/or guardian has been advised of the potential risks to privacy not withstanding such measures.     You have chosen to receive care through a telehealth visit.  Do you consent to use a video/audio connection for your medical care today? Yes    PROGRESS NOTE  Data:  Con Kirkpatrick is a 28 y.o. male who presents today for follow up    Chief Complaint: Anxiety and Depression    History of Present Illness: Patient reports reports fluctuating anxiety and depression through the week. Patient reports going trick or treating with his child yesterday and it went well with his baby's mother. Patient reports experiencing a wide range of emotions over the last few weeks. Patient reports difficulties in past two relationships since he has been out of incarceration. Patient reports trying to work on himself and setting boundaries with himself and others. Patient reports struggling with some limited use over the past couple of weeks but has refrained from all. Patient reports after losing his job and having a break up at the same time made it tough to  stay sober. Patient reports drinking alcohol after work one day to help him cope with stress. Patient reports he has been maintaining employment and trying to save money. Patient worked during session to identify triggers to use and discussed alternative steps to avoid those situations. Patient reports understanding that drinking alcohol and use is not where he wants to be. 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 Patient identified short term goals of hitting a meeting and starting document emotions and thoughts daily.       Clinical Maneuvering/Intervention: CBT, MI, Patient Centered    (Scales based on 0 - 10 with 10 being the worst)  Depression: 7 Anxiety: 8       Assisted patient in processing above session content; acknowledged and normalized patient’s thoughts, feelings, and concerns.  Rationalized patient thought process regarding recent stressors and life events. Discussed triggers associated with patient's emotions. Utilized CBT to process through and correct irrational cognitions with emphasis on  importance setting personal boundaries . Also discussed coping skills for patient to implement. Discussed recent return to previous undesired behavior and ways to avoid continuing that process.  Processed through thoughts and emotions surrounding controlling anger and difficulties and past 2 relationships.  Discussed identifying triggers to anger, substance use, setting boundaries, attending meeting with family member, and increasing self-care.    Allowed patient to freely discuss issues without interruption or judgment. Provided safe, confidential environment to facilitate the development of positive therapeutic relationship and encourage open, honest communication. Assisted patient in identifying risk factors which would indicate the need for higher level of care including thoughts to harm self or others and/or self-harming behavior and encouraged patient to contact this  office, call 911, or present to the nearest emergency room should any of these events occur. Discussed crisis intervention services and means to access. Patient adamantly and convincingly denies current suicidal or homicidal ideation or perceptual disturbance.    Assessment:   Assessment   Patient appears to maintain relative stability as compared to their baseline.  However, patient continues to struggle with anxiety and depression which continues to cause impairment in important areas of functioning.  A result, they can be reasonably expected to continue to benefit from treatment and would likely be at increased risk for decompensation otherwise.    Mental Status Exam:   Hygiene:   good  Cooperation:  Cooperative  Eye Contact:  Good  Psychomotor Behavior:  Appropriate  Affect:  Angry  Mood: fluctates  Speech:  Normal  Thought Process:  Linear  Thought Content:  Mood congruent  Suicidal:  None  Homicidal:  None  Hallucinations:  None  Delusion:  None  Memory:  Intact  Orientation:  Person, Place, Time and Situation  Reliability:  fair  Insight:  Fair  Judgement:  Fair  Impulse Control:  Fair  Physical/Medical Issues:  No        Patient's Support Network Includes:  extended family    Functional Status: Mild impairment     Progress toward goal: Patient is working towards practicing the ABC's of CBT model while at home to process through and correct or improve cognitions. Patient is making progress on processing thoughts and emotions during sessions, utilizing positive thinking strategies, daily positive affirmations and self care practices.     Prognosis: Good with Ongoing Treatment            Plan:    Patient will continue in individual outpatient therapy with focus on improved functioning and coping skills, maintaining stability, and avoiding decompensation and the need for higher level of care.    Patient will adhere to medication regimen as prescribed and report any side effects. Patient will contact this office,  call 911 or present to the nearest emergency room should suicidal or homicidal ideations occur. Provide Cognitive Behavioral Therapy and Solution Focused Therapy to improve functioning, maintain stability, and avoid decompensation and the need for higher level of care.     Return in about 2 weeks, or earlier if symptoms worsen or fail to improve.           VISIT DIAGNOSIS:   No diagnosis found.     There are no diagnoses linked to this encounter.       [facility] No Show Policy:  We understand unexpected circumstances arise; however, anytime you miss your appointment we are unable to provide you appropriate care.  In addition, each appointment missed could have been used to provide care for others.  We ask that you call at least 24 hours in advance to cancel or reschedule an appointment.  We would like to take this opportunity to remind you of our policy stating patients who miss THREE or more appointments without cancelling or rescheduling 24 hours in advance of the appointment may be subject to cancellation of any further visits with our clinic and recommendation to seek in-person services/visits.    Please call [phone] to reschedule your appointment. If there are reasons that make it difficult for you to keep the appointments, please call and let us know how we can help.  Please understand that medication prescribing will not continue without seeing your provider.      [facility]'s No Show Policy reviewed with patient at today's visit. Patient verbalized understanding of this policy. Discussed with patient that in the event that there are three or more no show visits, it will be recommended that they pursue in-person services/visits as noncompliance with telehealth visits indicates that patient is not an appropriate candidate for telemedicine and would likely be more appropriate for in-person services/visits. Patient verbalizes understanding and is agreeable to  this.       This document has been electronically signed by Rupert Wright III, LCSW  November 1, 2023 16:24 EDT      Part of this note may be an electronic transcription/translation of spoken language to printed text using the Dragon Dictation System.

## 2023-11-01 NOTE — PLAN OF CARE
Patient will identify triggers to use, limit contact with old people, places, and things associated with past use, set personal boundaries, limit or avoid alcohol use, develop support group, and attend meetings.

## 2023-11-14 ENCOUNTER — TELEMEDICINE (OUTPATIENT)
Dept: PSYCHIATRY | Facility: CLINIC | Age: 28
End: 2023-11-14
Payer: MEDICAID

## 2023-11-14 DIAGNOSIS — F41.1 GENERALIZED ANXIETY DISORDER: ICD-10-CM

## 2023-11-14 DIAGNOSIS — F33.1 MAJOR DEPRESSIVE DISORDER, RECURRENT EPISODE, MODERATE: Primary | ICD-10-CM

## 2023-11-14 PROCEDURE — 90832 PSYTX W PT 30 MINUTES: CPT | Performed by: COUNSELOR

## 2023-11-14 NOTE — PROGRESS NOTES
Date: November 14, 2023  Time In: 4:00PM  Time Out: 4:30PM  Patient reports having an appointment and requested to complete a 30 min session and scheduled an upcoming session for future date.        This provider is located at [place], [place] for [facility] (through [facility]), [address], [place] [address] using a secure B[employer] Video Visit through [employer]. Patient is being seen remotely via telehealth at home address in [place] and stated they are in a secure environment for this session. The patient's condition being diagnosed/treated is appropriate for telemedicine. The provider identified herself as well as her credentials. The patient, and/or patients guardian, consent to be seen remotely, and when consent is given they understand that the consent allows for patient identifiable information to be sent to a third party as needed. They may refuse to be seen remotely at any time. The electronic data is encrypted and password protected, and the patient and/or guardian has been advised of the potential risks to privacy not withstanding such measures.     You have chosen to receive care through a telehealth visit.  Do you consent to use a video/audio connection for your medical care today? Yes    PROGRESS NOTE  Data:  Con Kirkpatrick is a 28 y.o. male who presents today for follow up    Chief Complaint: Anxiety and Depression     History of Present Illness: Patient reports fluctuating anxiety and depression through the week. Patient reports currently experiencing conflict with his child's mother who is in an healthy relationship. 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 Patient reports current medications seem to be working and he was going to discuss medications with his provider tomorrow. Patient reports trying to save money to buy a car and wanting to be a better provider for his son. Patient reports some excessive  worrying over his son's safety at the child's mothers house. Patient reports that he has been getting better sleep since beginning his night night medications. Patient reports working towards avoiding stressful situations, peers who are using, and maintaining employment.      Clinical Maneuvering/Intervention: CBT, [place], Patient Centered    (Scales based on 0 - 10 with 10 being the worst)  Depression: 2 Anxiety: 8       Assisted patient in processing above session content; acknowledged and normalized patient’s thoughts, feelings, and concerns.  Rationalized patient thought process regarding recent stressors and life events. Discussed triggers associated with patient's emotions. Utilized CBT to process through and correct irrational cognitions with emphasis on  importance of setting solid personal boundaries with self and others. . Also discussed coping skills for patient to implement. Discussed recent events that have caused discord with his child's mother.  Processed through thoughts and emotions surrounding current stress with starting over and that desire to be a better provider for his son.  Discussed continued work towards setting boundaries with others, limiting contact with using peers, and increasing self-care through the holidays.    Allowed patient to freely discuss issues without interruption or judgment. Provided safe, confidential environment to facilitate the development of positive therapeutic relationship and encourage open, honest communication. Assisted patient in identifying risk factors which would indicate the need for higher level of care including thoughts to harm self or others and/or self-harming behavior and encouraged patient to contact this office, call 911, or present to the nearest emergency room should any of these events occur. Discussed crisis intervention services and means to access. Patient adamantly and convincingly denies current suicidal or homicidal ideation or perceptual  disturbance.    Assessment:   Assessment   Patient appears to maintain relative stability as compared to their baseline.  However, patient continues to struggle with anxiety and depression which continues to cause impairment in important areas of functioning.  A result, they can be reasonably expected to continue to benefit from treatment and would likely be at increased risk for decompensation otherwise.    Mental Status Exam:   Hygiene:   good  Cooperation:  Cooperative  Eye Contact:  Good  Psychomotor Behavior:  Appropriate  Affect:  Full range  Mood: fluctates  Speech:  Normal  Thought Process:  Linear  Thought Content:  Mood congruent  Suicidal:  None  Homicidal:  None  Hallucinations:  None  Delusion:  None  Memory:  Intact  Orientation:  Person, Place, Time and Situation  Reliability:  fair  Insight:  Fair  Judgement:  Fair  Impulse Control:  Fair  Physical/Medical Issues:  No        Patient's Support Network Includes:  extended family    Functional Status: Mild impairment     Progress toward goal: Patient is working towards practicing the ABC's of CBT model while at home to process through and correct or improve cognitions. Patient is making progress on processing thoughts and emotions during sessions, utilizing positive thinking strategies, daily positive affirmations and self care practices.     Prognosis: Good with Ongoing Treatment            Plan:    Patient will continue in individual outpatient therapy with focus on improved functioning and coping skills, maintaining stability, and avoiding decompensation and the need for higher level of care.    Patient will adhere to medication regimen as prescribed and report any side effects. Patient will contact this office, call 911 or present to the nearest emergency room should suicidal or homicidal ideations occur. Provide Cognitive Behavioral Therapy and Solution Focused Therapy to improve functioning, maintain stability, and avoid decompensation and the  need for higher level of care.     Return in about 2 weeks, or earlier if symptoms worsen or fail to improve.           VISIT DIAGNOSIS:     ICD-10-CM ICD-9-CM   1. Major depressive disorder, recurrent episode, moderate  F33.1 296.32   2. Generalized anxiety disorder  F41.1 300.02        Diagnoses and all orders for this visit:    1. Major depressive disorder, recurrent episode, moderate (Primary)    2. Generalized anxiety disorder           [facility] No Show Policy:  We understand unexpected circumstances arise; however, anytime you miss your appointment we are unable to provide you appropriate care.  In addition, each appointment missed could have been used to provide care for others.  We ask that you call at least 24 hours in advance to cancel or reschedule an appointment.  We would like to take this opportunity to remind you of our policy stating patients who miss THREE or more appointments without cancelling or rescheduling 24 hours in advance of the appointment may be subject to cancellation of any further visits with our clinic and recommendation to seek in-person services/visits.    Please call [phone] to reschedule your appointment. If there are reasons that make it difficult for you to keep the appointments, please call and let us know how we can help.  Please understand that medication prescribing will not continue without seeing your provider.      [facility]'s No Show Policy reviewed with patient at today's visit. Patient verbalized understanding of this policy. Discussed with patient that in the event that there are three or more no show visits, it will be recommended that they pursue in-person services/visits as noncompliance with telehealth visits indicates that patient is not an appropriate candidate for telemedicine and would likely be more appropriate for in-person services/visits. Patient verbalizes understanding and is agreeable to this.        This document has been electronically signed by Rupert Wright III, LCSW  November 14, 2023 16:00 EST      Part of this note may be an electronic transcription/translation of spoken language to printed text using the Dragon Dictation System.

## 2023-11-17 ENCOUNTER — TELEPHONE (OUTPATIENT)
Dept: PSYCHIATRY | Facility: CLINIC | Age: 28
End: 2023-11-17
Payer: MEDICAID

## 2023-11-20 DIAGNOSIS — F33.1 MAJOR DEPRESSIVE DISORDER, RECURRENT EPISODE, MODERATE: ICD-10-CM

## 2023-11-20 NOTE — TELEPHONE ENCOUNTER
Pt has no showed last two follow up appts. I will send in 10 days' of the Vraylar at 1.5mg. Needs to keep upcoming appt 11/27/2023 at 330pm Central time  to assess need to continue with current dosage of medication of if need dosage adjustment.

## 2023-11-27 ENCOUNTER — TELEMEDICINE (OUTPATIENT)
Dept: PSYCHIATRY | Facility: CLINIC | Age: 28
End: 2023-11-27
Payer: MEDICAID

## 2023-11-27 DIAGNOSIS — F33.1 MAJOR DEPRESSIVE DISORDER, RECURRENT EPISODE, MODERATE: Primary | ICD-10-CM

## 2023-11-27 DIAGNOSIS — F41.1 GENERALIZED ANXIETY DISORDER: ICD-10-CM

## 2023-11-27 PROCEDURE — 1159F MED LIST DOCD IN RCRD: CPT | Performed by: NURSE PRACTITIONER

## 2023-11-27 PROCEDURE — 99214 OFFICE O/P EST MOD 30 MIN: CPT | Performed by: NURSE PRACTITIONER

## 2023-11-27 PROCEDURE — 1160F RVW MEDS BY RX/DR IN RCRD: CPT | Performed by: NURSE PRACTITIONER

## 2023-11-27 NOTE — PATIENT INSTRUCTIONS
For concerns or needing assistance call the [facility] at [phone]  Lexapro 20 mg daily, take with food  Increase Vraylar 3.0 mg daily for depression  Trazodone 50 mg tablet-take 1/2 to 1 tablet for insomnia  Continue psychotherapy
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
## 2023-11-28 ENCOUNTER — TELEMEDICINE (OUTPATIENT)
Dept: PSYCHIATRY | Facility: CLINIC | Age: 28
End: 2023-11-28
Payer: MEDICAID

## 2023-11-28 DIAGNOSIS — F41.1 GENERALIZED ANXIETY DISORDER: ICD-10-CM

## 2023-11-28 DIAGNOSIS — F33.1 MAJOR DEPRESSIVE DISORDER, RECURRENT EPISODE, MODERATE: Primary | ICD-10-CM

## 2023-11-28 NOTE — PROGRESS NOTES
Date: November 28, 2023  Time In: 3:30PM  Time Out: 4:00PM    Patient was at work and requested to complete a 30 min session and scheduled a session for an upcoming date.     This provider is located at [place], [place] for [facility] (through [facility]), [address], [place] [address] using a secure [employer] Video Visit through [employer]. Patient is being seen remotely via telehealth at home address in [place] and stated they are in a secure environment for this session. The patient's condition being diagnosed/treated is appropriate for telemedicine. The provider identified herself as well as her credentials. The patient, and/or patients guardian, consent to be seen remotely, and when consent is given they understand that the consent allows for patient identifiable information to be sent to a third party as needed. They may refuse to be seen remotely at any time. The electronic data is encrypted and password protected, and the patient and/or guardian has been advised of the potential risks to privacy not withstanding such measures.     You have chosen to receive care through a telehealth visit.  Do you consent to use a video/audio connection for your medical care today? Yes    PROGRESS NOTE  Data:  Con Kirkpatrick is a 28 y.o. male who presents today for follow up    Chief Complaint: Anxiety and Depression     History of Present Illness: Patient reports fluctuating anxiety and depression through the week.  Patient reports current medications have been working well to manage symptoms he would have had difficulty regulating in the past. Patient reports identifying that hanging out with old people places and things could endanger his progress and freedom. Patient reports having plans to take his son up to his fathers for [place] holiday. Patient reports some discord with his child's mother about her current relationship. Patient reports his current living  situation is going well and he has been able to maintain gainful employment. Patient reports working towards setting short-term goals, establishing personal boundaries, and increasing self-care      Clinical Maneuvering/Intervention: CBT, MI, Patient Centered    (Scales based on 0 - 10 with 10 being the worst)  Depression: 1 Anxiety: 6       Assisted patient in processing above session content; acknowledged and normalized patient’s thoughts, feelings, and concerns.  Rationalized patient thought process regarding recent stressors and life events. Discussed triggers associated with patient's emotions. Utilized CBT to process through and correct irrational cognitions with emphasis on  importance of setting personal boundaries . Also discussed coping skills for patient to implement. Discussed seeing friends and social [place] getting in trouble and going to snf.  Processed through thoughts and emotions surrounding past relationships, current condition of relationship with the baby's mother, and upcoming trip planned to see his father who he has not seen for 10 years.  Discussed continued work towards avoiding old people places and things, setting personal boundaries, and increasing self-care.    Allowed patient to freely discuss issues without interruption or judgment. Provided safe, confidential environment to facilitate the development of positive therapeutic relationship and encourage open, honest communication. Assisted patient in identifying risk factors which would indicate the need for higher level of care including thoughts to harm self or others and/or self-harming behavior and encouraged patient to contact this office, call 911, or present to the nearest emergency room should any of these events occur. Discussed crisis intervention services and means to access. Patient adamantly and convincingly denies current suicidal or homicidal ideation or perceptual disturbance.    Assessment:   Assessment   Patient  appears to maintain relative stability as compared to their baseline.  However, patient continues to struggle with anxiety and  which continues to cause impairment in important areas of functioning.  A result, they can be reasonably expected to continue to benefit from treatment and would likely be at increased risk for decompensation otherwise.    Mental Status Exam:   Hygiene:   good  Cooperation:  Cooperative  Eye Contact:  Good  Psychomotor Behavior:  Appropriate  Affect:  Full range  Mood: fluctates  Speech:  Normal  Thought Process:  Linear  Thought Content:  Mood congruent  Suicidal:  None  Homicidal:  None  Hallucinations:  None  Delusion:  None  Memory:  Intact  Orientation:  Person, Place, Time and Situation  Reliability:  fair  Insight:  Fair  Judgement:  Fair  Impulse Control:  Fair  Physical/Medical Issues:  No        Patient's Support Network Includes:  extended family    Functional Status: Mild impairment     Progress toward goal: Patient is working towards practicing the ABC's of CBT model while at home to process through and correct or improve cognitions. Patient is making progress on processing thoughts and emotions during sessions, utilizing positive thinking strategies, daily positive affirmations and self care practices.     Prognosis: Good with Ongoing Treatment            Plan:    Patient will continue in individual outpatient therapy with focus on improved functioning and coping skills, maintaining stability, and avoiding decompensation and the need for higher level of care.    Patient will adhere to medication regimen as prescribed and report any side effects. Patient will contact this office, call 911 or present to the nearest emergency room should suicidal or homicidal ideations occur. Provide Cognitive Behavioral Therapy and Solution Focused Therapy to improve functioning, maintain stability, and avoid decompensation and the need for higher level of care.     Return in about 2 weeks, or  earlier if symptoms worsen or fail to improve.           VISIT DIAGNOSIS:     ICD-10-CM ICD-9-CM   1. Major depressive disorder, recurrent episode, moderate  F33.1 296.32   2. Generalized anxiety disorder  F41.1 300.02        Diagnoses and all orders for this visit:    1. Major depressive disorder, recurrent episode, moderate (Primary)    2. Generalized anxiety disorder           [facility] No Show Policy:  We understand unexpected circumstances arise; however, anytime you miss your appointment we are unable to provide you appropriate care.  In addition, each appointment missed could have been used to provide care for others.  We ask that you call at least 24 hours in advance to cancel or reschedule an appointment.  We would like to take this opportunity to remind you of our policy stating patients who miss THREE or more appointments without cancelling or rescheduling 24 hours in advance of the appointment may be subject to cancellation of any further visits with our clinic and recommendation to seek in-person services/visits.    Please call [phone] to reschedule your appointment. If there are reasons that make it difficult for you to keep the appointments, please call and let us know how we can help.  Please understand that medication prescribing will not continue without seeing your provider.      [facility]'s No Show Policy reviewed with patient at today's visit. Patient verbalized understanding of this policy. Discussed with patient that in the event that there are three or more no show visits, it will be recommended that they pursue in-person services/visits as noncompliance with telehealth visits indicates that patient is not an appropriate candidate for telemedicine and would likely be more appropriate for in-person services/visits. Patient verbalizes understanding and is agreeable to this.       This document has been electronically signed by Rupert  JODY Wright, LCSW  November 28, 2023 16:20 EST      Part of this note may be an electronic transcription/translation of spoken language to printed text using the Dragon Dictation System.

## 2023-12-11 ENCOUNTER — TELEMEDICINE (OUTPATIENT)
Dept: PSYCHIATRY | Facility: CLINIC | Age: 28
End: 2023-12-11
Payer: MEDICAID

## 2023-12-11 DIAGNOSIS — F33.1 MAJOR DEPRESSIVE DISORDER, RECURRENT EPISODE, MODERATE: Primary | ICD-10-CM

## 2023-12-11 DIAGNOSIS — F41.1 GENERALIZED ANXIETY DISORDER: ICD-10-CM

## 2023-12-11 DIAGNOSIS — F19.10 SUBSTANCE ABUSE: ICD-10-CM

## 2023-12-11 PROCEDURE — 90832 PSYTX W PT 30 MINUTES: CPT | Performed by: COUNSELOR

## 2023-12-11 NOTE — PROGRESS NOTES
Date: December 11, 2023  Time In: 4:30PM  Time Out: 5:00PM  This provider is located at [place], [place] for [facility] (through [facility]), [address], [place] [address] using a secure [employer] Video Visit through [employer]. Patient is being seen remotely via telehealth at home address in [place] and stated they are in a secure environment for this session. The patient's condition being diagnosed/treated is appropriate for telemedicine. The provider identified herself as well as her credentials. The patient, and/or patients guardian, consent to be seen remotely, and when consent is given they understand that the consent allows for patient identifiable information to be sent to a third party as needed. They may refuse to be seen remotely at any time. The electronic data is encrypted and password protected, and the patient and/or guardian has been advised of the potential risks to privacy not withstanding such measures.     You have chosen to receive care through a telehealth visit.  Do you consent to use a video/audio connection for your medical care today? Yes    PROGRESS NOTE  Data:  Con Kirkpatrick is a 28 y.o. male who presents today for follow up    Chief Complaint: Anxiety and Depression    History of Present Illness: Patient reports fluctuating depression and anxiety through the week. 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 Patient reports experiencing some depressed mood following a couple nights of partying last month.  Patient reports limiting interactions with people who are going out after work.  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  Patient reports working towards setting boundaries with self and others as well as taking time out to spend with his son.  Patient reports trying to save money and possibly move in to a house soon.  Patient  reports medications are working well to decrease symptoms and expressed wanting to have a conversation with his provider about medication adjustment.  Patient reports working towards setting short-term goals, maintaining healthy diet, and increasing quality time with his son.      Clinical Maneuvering/Intervention: CBT, MI, Patient Centered    (Scales based on 0 - 10 with 10 being the worst)  Depression: 6 Anxiety: 5       Assisted patient in processing above session content; acknowledged and normalized patient’s thoughts, feelings, and concerns.  Rationalized patient thought process regarding recent stressors and life events. Discussed triggers associated with patient's emotions. Utilized CBT to process through and correct irrational cognitions with emphasis on  recovery time for the brain  . Also discussed coping skills for patient to implement. Discussed different stages of recovery and the brain following use.  Processed through thoughts and emotions surrounding a new relationship, experiencing lack of motivation, and family dynamics.  Discussed setting personal boundaries, engaging in quality time with son, maintaining sobriety, and increasing self-care practices.    Allowed patient to freely discuss issues without interruption or judgment. Provided safe, confidential environment to facilitate the development of positive therapeutic relationship and encourage open, honest communication. Assisted patient in identifying risk factors which would indicate the need for higher level of care including thoughts to harm self or others and/or self-harming behavior and encouraged patient to contact this office, call 911, or present to the nearest emergency room should any of these events occur. Discussed crisis intervention services and means to access. Patient adamantly and convincingly denies current suicidal or homicidal ideation or perceptual disturbance.    Assessment:   Assessment   Patient appears to maintain  relative stability as compared to their baseline.  However, patient continues to struggle with depression and anxiety which continues to cause impairment in important areas of functioning.  A result, they can be reasonably expected to continue to benefit from treatment and would likely be at increased risk for decompensation otherwise.    Mental Status Exam:   Hygiene:   good  Cooperation:  Cooperative  Eye Contact:  Good  Psychomotor Behavior:  Appropriate  Affect:  Full range  Mood: fluctates  Speech:  Normal  Thought Process:  Linear  Thought Content:  Mood congruent  Suicidal:  None  Homicidal:  None  Hallucinations:  None  Delusion:  None  Memory:  Intact  Orientation:  Person, Place, Time and Situation  Reliability:  fair  Insight:  Fair  Judgement:  Fair  Impulse Control:  Fair  Physical/Medical Issues:  No        Patient's Support Network Includes:  son and extended family    Functional Status: Mild impairment     Progress toward goal: Patient is working towards practicing the ABC's of CBT model while at home to process through and correct or improve cognitions. Patient is making progress on processing thoughts and emotions during sessions, utilizing positive thinking strategies, daily positive affirmations and self care practices.     Prognosis: Good with Ongoing Treatment            Plan:    Patient will continue in individual outpatient therapy with focus on improved functioning and coping skills, maintaining stability, and avoiding decompensation and the need for higher level of care.    Patient will adhere to medication regimen as prescribed and report any side effects. Patient will contact this office, call 911 or present to the nearest emergency room should suicidal or homicidal ideations occur. Provide Cognitive Behavioral Therapy and Solution Focused Therapy to improve functioning, maintain stability, and avoid decompensation and the need for higher level of care.     Return in about 2 weeks, or  earlier if symptoms worsen or fail to improve.           VISIT DIAGNOSIS:     ICD-10-CM ICD-9-CM   1. Major depressive disorder, recurrent episode, moderate  F33.1 296.32   2. Generalized anxiety disorder  F41.1 300.02   3. Substance abuse  F19.10 305.90        Diagnoses and all orders for this visit:    1. Major depressive disorder, recurrent episode, moderate (Primary)    2. Generalized anxiety disorder    3. Substance abuse           [facility] No Show Policy:  We understand unexpected circumstances arise; however, anytime you miss your appointment we are unable to provide you appropriate care.  In addition, each appointment missed could have been used to provide care for others.  We ask that you call at least 24 hours in advance to cancel or reschedule an appointment.  We would like to take this opportunity to remind you of our policy stating patients who miss THREE or more appointments without cancelling or rescheduling 24 hours in advance of the appointment may be subject to cancellation of any further visits with our clinic and recommendation to seek in-person services/visits.    Please call [phone] to reschedule your appointment. If there are reasons that make it difficult for you to keep the appointments, please call and let us know how we can help.  Please understand that medication prescribing will not continue without seeing your provider.      [facility]'s No Show Policy reviewed with patient at today's visit. Patient verbalized understanding of this policy. Discussed with patient that in the event that there are three or more no show visits, it will be recommended that they pursue in-person services/visits as noncompliance with telehealth visits indicates that patient is not an appropriate candidate for telemedicine and would likely be more appropriate for in-person services/visits. Patient verbalizes understanding and is agreeable to this.        This document has been electronically signed by Rupert Wright III, LCSW  December 11, 2023 16:57 EST      Part of this note may be an electronic transcription/translation of spoken language to printed text using the Dragon Dictation System.

## 2023-12-18 ENCOUNTER — TELEMEDICINE (OUTPATIENT)
Dept: PSYCHIATRY | Facility: CLINIC | Age: 28
End: 2023-12-18
Payer: MEDICAID

## 2023-12-18 DIAGNOSIS — F33.1 MAJOR DEPRESSIVE DISORDER, RECURRENT EPISODE, MODERATE: ICD-10-CM

## 2023-12-18 RX ORDER — TRAZODONE HYDROCHLORIDE 50 MG/1
TABLET ORAL
Qty: 60 TABLET | Refills: 1 | Status: SHIPPED | OUTPATIENT
Start: 2023-12-18

## 2023-12-18 RX ORDER — ESCITALOPRAM OXALATE 20 MG/1
TABLET ORAL
Qty: 30 TABLET | Refills: 2 | Status: SHIPPED | OUTPATIENT
Start: 2023-12-18
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
## 2024-01-02 ENCOUNTER — TELEMEDICINE (OUTPATIENT)
Dept: PSYCHIATRY | Facility: CLINIC | Age: 29
End: 2024-01-02
Payer: MEDICAID

## 2024-01-02 DIAGNOSIS — F41.1 GENERALIZED ANXIETY DISORDER: ICD-10-CM

## 2024-01-02 DIAGNOSIS — F33.1 MAJOR DEPRESSIVE DISORDER, RECURRENT EPISODE, MODERATE: Primary | ICD-10-CM

## 2024-01-02 DIAGNOSIS — F19.10 SUBSTANCE ABUSE: ICD-10-CM

## 2024-01-02 PROCEDURE — 90832 PSYTX W PT 30 MINUTES: CPT | Performed by: COUNSELOR
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
## 2024-02-08 ENCOUNTER — TELEMEDICINE (OUTPATIENT)
Dept: PSYCHIATRY | Facility: CLINIC | Age: 29
End: 2024-02-08
Payer: MEDICAID

## 2024-02-08 DIAGNOSIS — F33.1 MAJOR DEPRESSIVE DISORDER, RECURRENT EPISODE, MODERATE: Primary | ICD-10-CM

## 2024-02-08 DIAGNOSIS — F41.1 GENERALIZED ANXIETY DISORDER: ICD-10-CM

## 2024-02-26 ENCOUNTER — TELEMEDICINE (OUTPATIENT)
Dept: PSYCHIATRY | Facility: CLINIC | Age: 29
End: 2024-02-26
Payer: MEDICAID

## 2024-02-26 DIAGNOSIS — F33.1 MAJOR DEPRESSIVE DISORDER, RECURRENT EPISODE, MODERATE: Primary | ICD-10-CM

## 2024-02-26 DIAGNOSIS — F41.1 GENERALIZED ANXIETY DISORDER: ICD-10-CM

## 2024-02-26 PROCEDURE — 90832 PSYTX W PT 30 MINUTES: CPT | Performed by: COUNSELOR

## 2024-02-26 NOTE — PROGRESS NOTES
Date: February 26, 2024  Time In: 4:30PM  Time Out: 5:00PM  This provider is located at [place], [place] for [facility] (through [facility]), [address], [place] [address] using a secure [employer] Video Visit through [employer]. Patient is being seen remotely via telehealth at home address in [place] and stated they are in a secure environment for this session. The patient's condition being diagnosed/treated is appropriate for telemedicine. The provider identified herself as well as her credentials. The patient, and/or patients guardian, consent to be seen remotely, and when consent is given they understand that the consent allows for patient identifiable information to be sent to a third party as needed. They may refuse to be seen remotely at any time. The electronic data is encrypted and password protected, and the patient and/or guardian has been advised of the potential risks to privacy not withstanding such measures.     You have chosen to receive care through a telehealth visit.  Do you consent to use a video/audio connection for your medical care today? Yes    PROGRESS NOTE  Data:  Con Kirkpatrick is a 28 y.o. male who presents today for follow up    Chief Complaint: Anxiety and Depression    History of Present Illness: Patient reports fluctuating anxiety and depression through the week. Patient reports recent work related stress due to not being able get enough hours and ended up quitting job. 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 Patient reports financial stress and girlfriend is currently incarcerated while completing obligations for the courts. Patient reports that he completed all requirements for parole and is currently done with sentencing. Patient reports spending more time with his son and the relationship with his son's mother. Patient reports not meeting with provider after missing  several appointments which in turn he is required to meet with primary provider for medications.  Patient reports medications have been working well to reduce symptoms and stabilize mood.  Patient reports he is almost out of medication and has an appointment with primary provider this week.  Patient reports making progress on regulating emotions and limiting substance use.  Patient reports working towards setting personal boundaries, applying for new position, maintaining healthy work life balance, and engaging in positive social interactions with others.      Clinical Maneuvering/Intervention: CBT, MI, Patient Centered    (Scales based on 0 - 10 with 10 being the worst)  Depression: 5 Anxiety: 6       Assisted patient in processing above session content; acknowledged and normalized patient’s thoughts, feelings, and concerns.  Rationalized patient thought process regarding recent stressors and life events. Discussed triggers associated with patient's emotions. Utilized CBT to process through and correct irrational cognitions with emphasis on  challenges and adversity after completing sentencing . Also discussed coping skills for patient to implement. Discussed recent financial stress after losing job and completing parole as well as having space due to girlfriend handling matters with the courts.  Processed through thoughts and emotions surrounding fear and uncertainty, financial stress, spending time with son, and setting boundaries with others. Discussed continued work towards utilizing coping skills, engaging in circular breathing and grounding techniques when feeling stressed or overwhelmed, engaging in positive thinking strategies, journaling thoughts and emotions,  setting personal boundaries  and finding ways to increase self care practices.     Allowed patient to freely discuss issues without interruption or judgment. Provided safe, confidential environment to facilitate the development of positive  therapeutic relationship and encourage open, honest communication. Assisted patient in identifying risk factors which would indicate the need for higher level of care including thoughts to harm self or others and/or self-harming behavior and encouraged patient to contact this office, call 911, or present to the nearest emergency room should any of these events occur. Discussed crisis intervention services and means to access. Patient adamantly and convincingly denies current suicidal or homicidal ideation or perceptual disturbance.    Assessment:   Assessment   Patient appears to maintain relative stability as compared to their baseline.  However, patient continues to struggle with anxiety and depression which continues to cause impairment in important areas of functioning.  A result, they can be reasonably expected to continue to benefit from treatment and would likely be at increased risk for decompensation otherwise.    Mental Status Exam:   Hygiene:   good  Cooperation:  Cooperative  Eye Contact:  Good  Psychomotor Behavior:  Appropriate  Affect:  Full range  Mood: fluctates  Speech:  Normal  Thought Process:  Linear  Thought Content:  Mood congruent  Suicidal:  None  Homicidal:  None  Hallucinations:  None  Delusion:  None  Memory:  Intact  Orientation:  Person, Place, Time and Situation  Reliability:  fair  Insight:  Fair  Judgement:  Fair  Impulse Control:  Fair  Physical/Medical Issues:  No        Patient's Support Network Includes:  significant other, son, and extended family    Functional Status: Moderate impairment     Progress toward goal: Patient is working towards practicing the ABC's of CBT model while at home to process through and correct or improve cognitions. Patient is making progress on processing thoughts and emotions during sessions, journaling, utilizing positive thinking strategies, engaging in breathing and grounding skills, daily positive affirmations and self care practices.      Prognosis: Good with Ongoing Treatment            Plan:    Patient will continue in individual outpatient therapy with focus on improved functioning and coping skills, maintaining stability, and avoiding decompensation and the need for higher level of care.    Patient will adhere to medication regimen as prescribed and report any side effects. Patient will contact this office, call 911 or present to the nearest emergency room should suicidal or homicidal ideations occur. Provide Cognitive Behavioral Therapy and Solution Focused Therapy to improve functioning, maintain stability, and avoid decompensation and the need for higher level of care.     Return in about 4 weeks, or earlier if symptoms worsen or fail to improve.           VISIT DIAGNOSIS:     ICD-10-CM ICD-9-CM   1. Major depressive disorder, recurrent episode, moderate  F33.1 296.32   2. Generalized anxiety disorder  F41.1 300.02        Diagnoses and all orders for this visit:    1. Major depressive disorder, recurrent episode, moderate (Primary)    2. Generalized anxiety disorder           [facility] No Show Policy:  We understand unexpected circumstances arise; however, anytime you miss your appointment we are unable to provide you appropriate care.  In addition, each appointment missed could have been used to provide care for others.  We ask that you call at least 24 hours in advance to cancel or reschedule an appointment.  We would like to take this opportunity to remind you of our policy stating patients who miss THREE or more appointments without cancelling or rescheduling 24 hours in advance of the appointment may be subject to cancellation of any further visits with our clinic and recommendation to seek in-person services/visits.    Please call [phone] to reschedule your appointment. If there are reasons that make it difficult for you to keep the appointments, please call and let us know how we can help.  Please  understand that medication prescribing will not continue without seeing your provider.      [facility]'s No Show Policy reviewed with patient at today's visit. Patient verbalized understanding of this policy. Discussed with patient that in the event that there are three or more no show visits, it will be recommended that they pursue in-person services/visits as noncompliance with telehealth visits indicates that patient is not an appropriate candidate for telemedicine and would likely be more appropriate for in-person services/visits. Patient verbalizes understanding and is agreeable to this.       This document has been electronically signed by Rupert Wright III, LCSW  February 26, 2024 18:29 EST      Part of this note may be an electronic transcription/translation of spoken language to printed text using the Dragon Dictation System.